# Patient Record
Sex: FEMALE | Race: WHITE | NOT HISPANIC OR LATINO | Employment: OTHER | URBAN - METROPOLITAN AREA
[De-identification: names, ages, dates, MRNs, and addresses within clinical notes are randomized per-mention and may not be internally consistent; named-entity substitution may affect disease eponyms.]

---

## 2017-01-24 ENCOUNTER — HOSPITAL ENCOUNTER (OUTPATIENT)
Facility: MEDICAL CENTER | Age: 72
End: 2017-01-24
Attending: NURSE PRACTITIONER
Payer: MEDICARE

## 2017-01-24 ENCOUNTER — NON-PROVIDER VISIT (OUTPATIENT)
Dept: MEDICAL GROUP | Facility: CLINIC | Age: 72
End: 2017-01-24
Payer: MEDICARE

## 2017-01-24 ENCOUNTER — TELEPHONE (OUTPATIENT)
Dept: MEDICAL GROUP | Facility: CLINIC | Age: 72
End: 2017-01-24

## 2017-01-24 DIAGNOSIS — I10 ESSENTIAL HYPERTENSION: ICD-10-CM

## 2017-01-24 LAB
BASOPHILS # BLD AUTO: 0.07 K/UL (ref 0–0.12)
BASOPHILS NFR BLD AUTO: 1.3 % (ref 0–1.8)
EOSINOPHIL # BLD: 0.23 K/UL (ref 0–0.51)
EOSINOPHIL NFR BLD AUTO: 4.2 % (ref 0–6.9)
ERYTHROCYTE [DISTWIDTH] IN BLOOD BY AUTOMATED COUNT: 45.5 FL (ref 35.9–50)
HCT VFR BLD AUTO: 38.8 % (ref 37–47)
HGB BLD-MCNC: 11.9 G/DL (ref 12–16)
IMM GRANULOCYTES # BLD AUTO: 0.02 K/UL (ref 0–0.11)
IMM GRANULOCYTES NFR BLD AUTO: 0.4 % (ref 0–0.9)
LYMPHOCYTES # BLD: 0.8 K/UL (ref 1–4.8)
LYMPHOCYTES NFR BLD AUTO: 14.5 % (ref 22–41)
MCH RBC QN AUTO: 29.8 PG (ref 27–33)
MCHC RBC AUTO-ENTMCNC: 30.7 G/DL (ref 33.6–35)
MCV RBC AUTO: 97 FL (ref 81.4–97.8)
MONOCYTES # BLD: 0.85 K/UL (ref 0–0.85)
MONOCYTES NFR BLD AUTO: 15.4 % (ref 0–13.4)
NEUTROPHILS # BLD: 3.55 K/UL (ref 2–7.15)
NEUTROPHILS NFR BLD AUTO: 64.2 % (ref 44–72)
NRBC # BLD AUTO: 0 K/UL
NRBC BLD-RTO: 0 /100 WBC
PLATELET # BLD AUTO: 304 K/UL (ref 164–446)
PMV BLD AUTO: 9.2 FL (ref 9–12.9)
RBC # BLD AUTO: 4 M/UL (ref 4.2–5.4)
T4 FREE SERPL-MCNC: 0.77 NG/DL (ref 0.53–1.43)
TSH SERPL DL<=0.005 MIU/L-ACNC: 6.02 UIU/ML (ref 0.3–3.7)
WBC # BLD AUTO: 5.5 K/UL (ref 4.8–10.8)

## 2017-01-24 PROCEDURE — 99000 SPECIMEN HANDLING OFFICE-LAB: CPT | Performed by: NURSE PRACTITIONER

## 2017-01-24 PROCEDURE — 36415 COLL VENOUS BLD VENIPUNCTURE: CPT | Performed by: NURSE PRACTITIONER

## 2017-01-24 PROCEDURE — 84439 ASSAY OF FREE THYROXINE: CPT

## 2017-01-24 PROCEDURE — 85025 COMPLETE CBC W/AUTO DIFF WBC: CPT

## 2017-01-24 PROCEDURE — 84443 ASSAY THYROID STIM HORMONE: CPT

## 2017-01-24 NOTE — TELEPHONE ENCOUNTER
Received a call regarding patient's medication.  They need the paperwork signed for the medication.  [phone]

## 2017-01-25 ENCOUNTER — OFFICE VISIT (OUTPATIENT)
Dept: MEDICAL GROUP | Facility: CLINIC | Age: 72
End: 2017-01-25
Payer: MEDICARE

## 2017-01-25 VITALS
WEIGHT: 124 LBS | OXYGEN SATURATION: 92 % | RESPIRATION RATE: 16 BRPM | HEIGHT: 62 IN | BODY MASS INDEX: 22.82 KG/M2 | SYSTOLIC BLOOD PRESSURE: 134 MMHG | TEMPERATURE: 98.3 F | DIASTOLIC BLOOD PRESSURE: 82 MMHG | HEART RATE: 88 BPM

## 2017-01-25 DIAGNOSIS — G89.29 CHRONIC BILATERAL LOW BACK PAIN WITHOUT SCIATICA: ICD-10-CM

## 2017-01-25 DIAGNOSIS — F31.30 BIPOLAR AFFECTIVE DISORDER, CURRENT EPISODE DEPRESSED, CURRENT EPISODE SEVERITY UNSPECIFIED (HCC): ICD-10-CM

## 2017-01-25 DIAGNOSIS — M54.50 CHRONIC BILATERAL LOW BACK PAIN WITHOUT SCIATICA: ICD-10-CM

## 2017-01-25 DIAGNOSIS — D50.9 IRON DEFICIENCY ANEMIA, UNSPECIFIED IRON DEFICIENCY ANEMIA TYPE: ICD-10-CM

## 2017-01-25 PROCEDURE — G8432 DEP SCR NOT DOC, RNG: HCPCS | Performed by: NURSE PRACTITIONER

## 2017-01-25 PROCEDURE — 4040F PNEUMOC VAC/ADMIN/RCVD: CPT | Mod: 8P | Performed by: NURSE PRACTITIONER

## 2017-01-25 PROCEDURE — 3014F SCREEN MAMMO DOC REV: CPT | Mod: 8P | Performed by: NURSE PRACTITIONER

## 2017-01-25 PROCEDURE — G8484 FLU IMMUNIZE NO ADMIN: HCPCS | Performed by: NURSE PRACTITIONER

## 2017-01-25 PROCEDURE — G8419 CALC BMI OUT NRM PARAM NOF/U: HCPCS | Performed by: NURSE PRACTITIONER

## 2017-01-25 PROCEDURE — 1101F PT FALLS ASSESS-DOCD LE1/YR: CPT | Mod: 8P | Performed by: NURSE PRACTITIONER

## 2017-01-25 PROCEDURE — 99214 OFFICE O/P EST MOD 30 MIN: CPT | Performed by: NURSE PRACTITIONER

## 2017-01-25 PROCEDURE — 1036F TOBACCO NON-USER: CPT | Performed by: NURSE PRACTITIONER

## 2017-01-25 PROCEDURE — 3017F COLORECTAL CA SCREEN DOC REV: CPT | Mod: 8P | Performed by: NURSE PRACTITIONER

## 2017-01-25 RX ORDER — FERROUS SULFATE 325(65) MG
325 TABLET ORAL DAILY
Qty: 30 TAB | Refills: 2 | Status: SHIPPED | OUTPATIENT
Start: 2017-01-25 | End: 2017-05-25 | Stop reason: SDUPTHER

## 2017-01-25 RX ORDER — TRAZODONE HYDROCHLORIDE 100 MG/1
200 TABLET ORAL NIGHTLY PRN
Qty: 60 TAB | Refills: 2 | Status: SHIPPED | OUTPATIENT
Start: 2017-01-25 | End: 2017-04-04 | Stop reason: SDUPTHER

## 2017-01-25 RX ORDER — OXYCODONE AND ACETAMINOPHEN 10; 325 MG/1; MG/1
1 TABLET ORAL 2 TIMES DAILY PRN
Qty: 75 TAB | Refills: 0 | Status: SHIPPED | OUTPATIENT
Start: 2017-01-25 | End: 2017-04-06 | Stop reason: SDUPTHER

## 2017-01-25 RX ORDER — OXYCODONE AND ACETAMINOPHEN 10; 325 MG/1; MG/1
1 TABLET ORAL 2 TIMES DAILY PRN
Qty: 75 TAB | Refills: 0 | Status: SHIPPED | OUTPATIENT
Start: 2017-01-25 | End: 2017-01-25 | Stop reason: SDUPTHER

## 2017-01-25 NOTE — PROGRESS NOTES
Chief Complaint   Patient presents with   • Follow-Up     labs       HISTORY OF PRESENT ILLNESS: Patient is a 71 y.o. female established patient who presents today to request medication refills, to review her recent lab results, and to discuss her health concerns as outlined below.      Low back pain  Chronic, takes 2-3 pills per day  Will schedule with spine    Iron deficiency anemia  Start iron    Bipolar affect, depressed  This is a chronic problem that is controlled with trazodone. Patient denies any adverse reactions to her medication and is requesting a refill today.        Patient Active Problem List    Diagnosis Date Noted   • Iron deficiency anemia 01/25/2017   • Skin lesion of face 08/25/2016   • Oxygen dependent 06/08/2016   • Anxiety 05/20/2015   • Anxiety and depression 04/02/2015   • Low back pain 01/23/2014   • Chest pain 04/18/2013   • Syncope and collapse 04/18/2013   • Essential hypertension 04/18/2013   • Bipolar affect, depressed (CMS-HCC) 06/23/2012   • Nocturnal hypoxia 06/23/2012   • Depressed 09/07/2011   • Tobacco abuse 09/07/2011   • COPD (chronic obstructive pulmonary disease) (CMS-HCC)        Allergies:Bee    Current Outpatient Prescriptions   Medication Sig Dispense Refill   • oxycodone-acetaminophen (PERCOCET-10)  MG Tab Take 1 Tab by mouth 2 times a day as needed for Severe Pain (2-3 times daily). 75 Tab 0   • ferrous sulfate 325 (65 FE) MG tablet Take 1 Tab by mouth every day. 30 Tab 2   • trazodone (DESYREL) 100 MG Tab Take 2 Tabs by mouth at bedtime as needed for Sleep. AS NEEDED FOR SLEEP 60 Tab 2   • lisinopril (PRINIVIL) 10 MG Tab TAKE ONE TABLET BY MOUTH ONCE A DAY 90 Tab 1   • conjugated estrogen (PREMARIN) 0.625 MG Tab TAKE ONE TABLET BY MOUTH DAILY 90 Tab 1   • baclofen (LIORESAL) 10 MG Tab Take 1 Tab by mouth 3 times a day. 90 Tab 0   • escitalopram (LEXAPRO) 20 MG tablet TAKE ONE TABLET BY MOUTH ONE TIME A DAY 90 Tab 1   • Cholecalciferol (VITAMIN D3) 2000 UNIT Cap  Take 1 Cap by mouth every day. 30 Cap 3   • gabapentin (NEURONTIN) 300 MG Cap TAKE ONE CAPSULE BY MOUTH TWICE A  Cap 3   • buPROPion SR (WELLBUTRIN-SR) 150 MG TABLET SR 12 HR sustained-release tablet TAKE ONE TABLET BY MOUTH TWICE A DAY 60 Tab 2   • fluticasone-salmeterol (ADVAIR DISKUS) 100-50 MCG/DOSE AEROSOL POWDER, BREATH ACTIVATED Inhale 1 Puff by mouth every 12 hours. 1 Inhaler 2   • aspirin (ASA) 325 MG Tab Take 325 mg by mouth every 6 hours as needed.     • buPROPion SR (WELLBUTRIN-SR) 150 MG TABLET SR 12 HR sustained-release tablet TAKE ONE TABLET BY MOUTH TWICE A DAY 60 Tab 0   • buPROPion SR (WELLBUTRIN-SR) 150 MG TABLET SR 12 HR sustained-release tablet      • gabapentin (NEURONTIN) 600 MG tablet Take 600 mg by mouth 2 times a day.     • hydrocodone-acetaminophen (NORCO) 5-325 MG Tab per tablet      • prednisoLONE acetate (PRED FORTE) 1 % Suspension      • olopatadine (PATANOL) 0.1 % ophthalmic solution PLACE 1 DROP IN EACH EYE TWO TIMES A DAY 1 Bottle 2   • clopidogrel (PLAVIX) 75 MG Tab Take 1 Tab by mouth every day. 30 Tab 6   • ondansetron (ZOFRAN ODT) 4 MG TABLET DISPERSIBLE Take 1 Tab by mouth every 8 hours as needed for Nausea/Vomiting. 10 Tab 0   • NON SPECIFIED O2 sat at 84% on room air in office, needs O2 24/7 and would like portable concentrator 1 Each 0   • albuterol (PROAIR HFA) 108 (90 BASE) MCG/ACT Aero Soln inhalation aerosol INHALE TWO PUFFS BY MOUTH EVERY 6 HOURS AS NEEDED FOR SHORTNESS OF BREATH 1 Inhaler 1   • predniSONE (DELTASONE) 20 MG TABS Take 40 mg by mouth every day.     • Multiple Vitamins-Minerals (WOMENS MULTI VITAMIN & MINERAL) TABS Take  by mouth every day.     • pravastatin (PRAVACHOL) 20 MG TABS Take 1 Tab by mouth every bedtime. 90 Tab 3   • tiotropium (SPIRIVA) 18 MCG CAPS Inhale 18 mcg by mouth every day.       No current facility-administered medications for this visit.       Reviewed today: Past medical history, social history, and family history. Updated as  "needed.    Social History     Social History Narrative       ROS:  Review of Systems   Constitutional: Negative for fever, lethargy and unexplained weight loss.   Respiratory: Negative for cough, wheezing and SOB.   Cardiovascular: Negative for chest pain, dizziness, and leg swelling.    Gastrointestinal: Negative for nausea, vomiting, and diarrhea.   Psych: Negative for anxiety and depression.    All other systems reviewed and are negative except as in HPI.      Exam:  Blood pressure 134/82, pulse 88, temperature 36.8 °C (98.3 °F), resp. rate 16, height 1.575 m (5' 2\"), weight 56.246 kg (124 lb), SpO2 92 %.  General:  Well nourished, well developed female in NAD  Head: normocephalic, atraumatic  Eyes: EOM within normal limits, no conjunctival injection, no scleral icterus  Nose: symmetrical, no discharge.  Neck: no masses, range of motion within normal limits, no tracheal deviation. No obvious thyroid enlargement. No adenopathy.   Pulmonary: chest is symmetrical with respiration, no wheezes, crackles, or rhonchi. Normal effort.   Cardiovascular: regular rate and rhythm without murmurs, rubs, or gallops.  Musculoskeletal: Normal gait, grossly normal muscle tone.  Extremities: no clubbing, cyanosis, or edema.  Psych: appropriate mood, affect, judgement.   Skin: Pink, warm, dry.    LABS: 1/24/17: Results reviewed and discussed with the patient, questions answered.        Please note that this dictation was created using voice recognition software. I have made every reasonable attempt to correct obvious errors, but I expect that there are errors of grammar and possibly content that I did not discover before finalizing the note.    Assessment/Plan:  1. Chronic bilateral low back pain without sciatica  oxycodone-acetaminophen (PERCOCET-10)  MG Tab    DISCONTINUED: oxycodone-acetaminophen (PERCOCET-10)  MG Tab    DISCONTINUED: oxycodone-acetaminophen (PERCOCET-10)  MG Tab    Stable. Continue current " medications, continue physical therapy. Referral initiated to spine. Follow-up in 3 months.   2. Iron deficiency anemia, unspecified iron deficiency anemia type  ferrous sulfate 325 (65 FE) MG tablet   3. Bipolar affective disorder, current episode depressed, current episode severity unspecified (CMS-HCC)  trazodone (DESYREL) 100 MG Tab

## 2017-01-25 NOTE — MR AVS SNAPSHOT
"        Sarah Almendarez Snowball   2017 2:40 PM   Office Visit   MRN: [id number]    [facility] Phone:  [phone]    Description:  Female : 1945   Provider:  MARICEL Giordano           Reason for Visit     Follow-Up labs      Allergies as of 2017     Allergen Noted Reactions    Bee 2010         You were diagnosed with     Chronic bilateral low back pain without sciatica   [9422590]   Stable. Continue current medications, continue physical therapy. Referral initiated to spine. Follow-up in 3 months.    Iron deficiency anemia, unspecified iron deficiency anemia type   [1418841]       Bipolar affective disorder, current episode depressed, current episode severity unspecified (CMS-HCC)   [[id number]]         Vital Signs     Blood Pressure Pulse Temperature Respirations Height Weight    134/82 mmHg 88 36.8 °C (98.3 °F) 16 1.575 m (5' 2\") 56.246 kg (124 lb)    Body Mass Index Oxygen Saturation Smoking Status             22.67 kg/m2 92% Former Smoker         Basic Information     Date Of Birth Sex Race Ethnicity Preferred Language    1945 Female White Non- English      Problem List              ICD-10-CM Priority Class Noted - Resolved    COPD (chronic obstructive pulmonary disease) (CMS-HCC) J44.9   Unknown - Present    Depressed F32.9   2011 - Present    Tobacco abuse Z72.0   2011 - Present    Bipolar affect, depressed (CMS-HCC) F31.30   2012 - Present    Nocturnal hypoxia G47.34   2012 - Present    Chest pain R07.9   2013 - Present    Syncope and collapse R55   2013 - Present    Essential hypertension I10   2013 - Present    Low back pain M54.5   2014 - Present    Anxiety and depression F41.9, F32.9   2015 - Present    Anxiety F41.9   2015 - Present    Oxygen dependent Z99.81   2016 - Present    Skin lesion of face L98.9   2016 - Present    Iron deficiency anemia D50.9   2017 - Present      "   Health Maintenance        Date Due Completion Dates    IMM DTaP/Tdap/Td Vaccine (1 - Tdap) 3/12/1966 ---    IMM ZOSTER VACCINE 3/12/2007 ---    IMM PNEUMOCOCCAL 65+ (ADULT) LOW/MEDIUM RISK SERIES (1 of 2 - PCV13) 3/12/2012 ---    MAMMOGRAM 7/16/2016 7/16/2015, 1/16/2014    IMM INFLUENZA (1) 9/1/2016 ---    BONE DENSITY 1/24/2019 1/24/2014 (Prv Comp)    Override on 1/24/2014: Previously completed    COLONOSCOPY 1/14/2024 1/14/2014 (Declined), 8/13/2002    Override on 1/14/2014: Patient Declined            Current Immunizations     No immunizations on file.      Below and/or attached are the medications your provider expects you to take. Review all of your home medications and newly ordered medications with your provider and/or pharmacist. Follow medication instructions as directed by your provider and/or pharmacist. Please keep your medication list with you and share with your provider. Update the information when medications are discontinued, doses are changed, or new medications (including over-the-counter products) are added; and carry medication information at all times in the event of emergency situations     Allergies:  BEE - (reactions not documented)               Medications  Valid as of: January 25, 2017 -  3:27 PM    Generic Name Brand Name Tablet Size Instructions for use    Albuterol Sulfate (Aero Soln) albuterol 108 (90 BASE) MCG/ACT INHALE TWO PUFFS BY MOUTH EVERY 6 HOURS AS NEEDED FOR SHORTNESS OF BREATH        Aspirin (Tab)  MG Take 325 mg by mouth every 6 hours as needed.        Baclofen (Tab) LIORESAL 10 MG TAKE ONE TABLET BY MOUTH THREE TIMES A DAY        BuPROPion HCl (TABLET SR 12 HR) WELLBUTRIN- MG         BuPROPion HCl (TABLET SR 12 HR) WELLBUTRIN- MG TAKE ONE TABLET BY MOUTH TWICE A DAY        BuPROPion HCl (TABLET SR 12 HR) WELLBUTRIN- MG TAKE ONE TABLET BY MOUTH TWICE A DAY        Cholecalciferol (Cap) Vitamin D3 2000 UNIT Take 1 Cap by mouth every day.         Clopidogrel Bisulfate (Tab) PLAVIX 75 MG Take 1 Tab by mouth every day.        Escitalopram Oxalate (Tab) LEXAPRO 20 MG TAKE ONE TABLET BY MOUTH ONE TIME A DAY        Estrogens Conjugated (Tab) PREMARIN 0.625 MG TAKE ONE TABLET BY MOUTH DAILY        Ferrous Sulfate (Tab) ferrous sulfate 325 (65 FE) MG Take 1 Tab by mouth every day.        Fluticasone-Salmeterol (AEROSOL POWDER, BREATH ACTIVATED) ADVAIR 100-50 MCG/DOSE Inhale 1 Puff by mouth every 12 hours.        Gabapentin (Tab) NEURONTIN 600 MG Take 600 mg by mouth 2 times a day.        Gabapentin (Cap) NEURONTIN 300 MG TAKE ONE CAPSULE BY MOUTH TWICE A DAY        Hydrocodone-Acetaminophen (Tab) NORCO 5-325 MG         Lisinopril (Tab) PRINIVIL 10 MG TAKE ONE TABLET BY MOUTH ONCE A DAY        Multiple Vitamins-Minerals (Tab) WOMENS MULTI VITAMIN & MINERAL  Take  by mouth every day.        NON SPECIFIED   O2 sat at 84% on room air in office, needs O2 24/7 and would like portable concentrator        Olopatadine HCl (Solution) PATANOL 0.1 % PLACE 1 DROP IN EACH EYE TWO TIMES A DAY        Ondansetron (TABLET DISPERSIBLE) ZOFRAN ODT 4 MG Take 1 Tab by mouth every 8 hours as needed for Nausea/Vomiting.        Oxycodone-Acetaminophen (Tab) PERCOCET-10  MG Take 1 Tab by mouth 2 times a day as needed for Severe Pain (2-3 times daily).        Pravastatin Sodium (Tab) PRAVACHOL 20 MG Take 1 Tab by mouth every bedtime.        PrednisoLONE Acetate (Suspension) PRED FORTE 1 %         PredniSONE (Tab) DELTASONE 20 MG Take 40 mg by mouth every day.        Tiotropium Bromide Monohydrate (Cap) SPIRIVA 18 MCG Inhale 18 mcg by mouth every day.        TraZODone HCl (Tab) DESYREL 100 MG Take 2 Tabs by mouth at bedtime as needed for Sleep. AS NEEDED FOR SLEEP        .                 Medicines prescribed today were sent to:     [facility] - [place], [place] - [address] [place] [address]    [address] [facility] [address] [facility] [address]    Phone: [phone] Fax: [phone]     Open 24 Hours?: No      Medication refill instructions:       If your prescription bottle indicates you have medication refills left, it is not necessary to call your provider’s office. Please contact your pharmacy and they will refill your medication.    If your prescription bottle indicates you do not have any refills left, you may request refills at any time through one of the following ways: The online [employer] system (except Urgent Care), by calling your provider’s office, or by asking your pharmacy to contact your provider’s office with a refill request. Medication refills are processed only during regular business hours and may not be available until the next business day. Your provider may request additional information or to have a follow-up visit with you prior to refilling your medication.   *Please Note: Medication refills are assigned a new Rx number when refilled electronically. Your pharmacy may indicate that no refills were authorized even though a new prescription for the same medication is available at the pharmacy. Please request the medicine by name with the pharmacy before contacting your provider for a refill.           [employer] Access Code: [email or web address]  Expires: 2/23/2017  9:36 AM    [employer]  A secure, online tool to manage your health information     [employer]® is a secure, online tool that connects you to your personalized health information from the privacy of your home -- day or night - making it very easy for you to manage your healthcare. Once the activation process is completed, you can even access your medical information using the [employer] fanny, which is available for free in the Apple Fanny store or Google Play store.     [employer] provides the following levels of access (as shown below):   My Chart Features   [facility] Doctor [facility]  Specialists [facility]  Urgent  Care Non-[facility]  Primary Care  Doctor   Email your healthcare team securely and privately 24/7 X X X     Manage appointments: schedule your next appointment; view details of past/upcoming appointments X      Request prescription refills. X      View recent personal medical records, including lab and immunizations X X X X   View health record, including health history, allergies, medications X X X X   Read reports about your outpatient visits, procedures, consult and ER notes X X X X   See your discharge summary, which is a recap of your hospital and/or ER visit that includes your diagnosis, lab results, and care plan. X X       How to register for [employer]:  1. Go to  [email or web address].  2. Click on the Sign Up Now box, which takes you to the New Member Sign Up page. You will need to provide the following information:  a. Enter your [employer] Access Code exactly as it appears at the top of this page. (You will not need to use this code after you’ve completed the sign-up process. If you do not sign up before the expiration date, you must request a new code.)   b. Enter your date of birth.   c. Enter your home email address.   d. Click Submit, and follow the next screen’s instructions.  3. Create a [employer] ID. This will be your [employer] login ID and cannot be changed, so think of one that is secure and easy to remember.  4. Create a [employer] password. You can change your password at any time.  5. Enter your Password Reset Question and Answer. This can be used at a later time if you forget your password.   6. Enter your e-mail address. This allows you to receive e-mail notifications when new information is available in [employer].  7. Click Sign Up. You can now view your health information.    For assistance activating your [employer] account, call [phone]

## 2017-01-26 DIAGNOSIS — I15.9 SECONDARY HYPERTENSION, UNSPECIFIED: ICD-10-CM

## 2017-01-26 DIAGNOSIS — F32.A DEPRESSION, UNSPECIFIED DEPRESSION TYPE: ICD-10-CM

## 2017-01-26 DIAGNOSIS — J43.9 PULMONARY EMPHYSEMA, UNSPECIFIED EMPHYSEMA TYPE (HCC): ICD-10-CM

## 2017-01-26 RX ORDER — LISINOPRIL 10 MG/1
TABLET ORAL
Qty: 90 TAB | Refills: 1 | Status: SHIPPED | OUTPATIENT
Start: 2017-01-26 | End: 2017-08-21 | Stop reason: SDUPTHER

## 2017-01-26 RX ORDER — ESCITALOPRAM OXALATE 20 MG/1
TABLET ORAL
Qty: 90 TAB | Refills: 1 | Status: SHIPPED | OUTPATIENT
Start: 2017-01-26 | End: 2017-05-19

## 2017-01-26 RX ORDER — BACLOFEN 10 MG/1
10 TABLET ORAL 3 TIMES DAILY
Qty: 90 TAB | Refills: 0 | Status: SHIPPED | OUTPATIENT
Start: 2017-01-26 | End: 2017-08-30 | Stop reason: SDUPTHER

## 2017-01-30 ENCOUNTER — TELEPHONE (OUTPATIENT)
Dept: MEDICAL GROUP | Facility: CLINIC | Age: 72
End: 2017-01-30

## 2017-01-30 NOTE — TELEPHONE ENCOUNTER
1. Caller Name: Jared                      Call Back Number: [phone]    2. Message: Preformance 20mcg per 2ml, BID and albuterol 2.5mg/ 3ML, 1 QD PRN for rescue needs to be added onto her medication list.  Once this is done please fax chart notes and updated medicaiton lest to [phone].    3. Patient approves office to leave a detailed voicemail/MyChart message: no and N\A

## 2017-02-01 ENCOUNTER — TELEPHONE (OUTPATIENT)
Dept: MEDICAL GROUP | Facility: CLINIC | Age: 72
End: 2017-02-01

## 2017-02-01 NOTE — TELEPHONE ENCOUNTER
Status: Signed         Expand All Collapse All    1. Caller Name: Jared                      Call Back Number: [phone]    2. Message: Preformance 20mcg per 2ml, BID and albuterol 2.5mg/ 3ML, 1 QD PRN for rescue needs to be added onto her medication list.  Once this is done please fax chart notes and updated medicaiton list to [phone].    3. Patient approves office to leave a detailed voicemail/MyChart message: no and N\A

## 2017-02-01 NOTE — ASSESSMENT & PLAN NOTE
This is a chronic problem that is controlled with trazodone. Patient denies any adverse reactions to her medication and is requesting a refill today.

## 2017-02-03 RX ORDER — FORMOTEROL FUMARATE DIHYDRATE 20 UG/2ML
20 SOLUTION RESPIRATORY (INHALATION)
COMMUNITY

## 2017-02-03 RX ORDER — BUDESONIDE 0.25 MG/2ML
250 INHALANT ORAL 2 TIMES DAILY
COMMUNITY
End: 2017-04-06

## 2017-02-03 RX ORDER — ALBUTEROL SULFATE 2.5 MG/3ML
2.5 SOLUTION RESPIRATORY (INHALATION) EVERY 4 HOURS PRN
COMMUNITY

## 2017-02-18 ENCOUNTER — PATIENT OUTREACH (OUTPATIENT)
Dept: HEALTH INFORMATION MANAGEMENT | Facility: OTHER | Age: 72
End: 2017-02-18

## 2017-02-18 NOTE — PROGRESS NOTES
Attempt #:1     Annual Wellness Visit Scheduling  1. Scheduling Status:Not Scheduled. Patient states they are not interested    MyChart Activation: declined

## 2017-02-23 ENCOUNTER — OFFICE VISIT (OUTPATIENT)
Dept: MEDICAL GROUP | Facility: CLINIC | Age: 72
End: 2017-02-23
Payer: MEDICARE

## 2017-02-23 VITALS
HEART RATE: 83 BPM | HEIGHT: 62 IN | TEMPERATURE: 98.1 F | WEIGHT: 127 LBS | SYSTOLIC BLOOD PRESSURE: 122 MMHG | OXYGEN SATURATION: 96 % | BODY MASS INDEX: 23.37 KG/M2 | DIASTOLIC BLOOD PRESSURE: 74 MMHG | RESPIRATION RATE: 16 BRPM

## 2017-02-23 DIAGNOSIS — J44.9 CHRONIC OBSTRUCTIVE PULMONARY DISEASE, UNSPECIFIED COPD TYPE (HCC): ICD-10-CM

## 2017-02-23 DIAGNOSIS — L98.9 SKIN LESION OF FACE: ICD-10-CM

## 2017-02-23 DIAGNOSIS — G89.29 OTHER CHRONIC PAIN: ICD-10-CM

## 2017-02-23 PROCEDURE — G8420 CALC BMI NORM PARAMETERS: HCPCS | Performed by: NURSE PRACTITIONER

## 2017-02-23 PROCEDURE — 3017F COLORECTAL CA SCREEN DOC REV: CPT | Mod: 8P | Performed by: NURSE PRACTITIONER

## 2017-02-23 PROCEDURE — 99214 OFFICE O/P EST MOD 30 MIN: CPT | Performed by: NURSE PRACTITIONER

## 2017-02-23 PROCEDURE — 1101F PT FALLS ASSESS-DOCD LE1/YR: CPT | Mod: 8P | Performed by: NURSE PRACTITIONER

## 2017-02-23 PROCEDURE — G8484 FLU IMMUNIZE NO ADMIN: HCPCS | Performed by: NURSE PRACTITIONER

## 2017-02-23 PROCEDURE — 4040F PNEUMOC VAC/ADMIN/RCVD: CPT | Mod: 8P | Performed by: NURSE PRACTITIONER

## 2017-02-23 PROCEDURE — 1036F TOBACCO NON-USER: CPT | Performed by: NURSE PRACTITIONER

## 2017-02-23 PROCEDURE — 3014F SCREEN MAMMO DOC REV: CPT | Mod: 8P | Performed by: NURSE PRACTITIONER

## 2017-02-23 PROCEDURE — G8432 DEP SCR NOT DOC, RNG: HCPCS | Performed by: NURSE PRACTITIONER

## 2017-02-23 NOTE — ASSESSMENT & PLAN NOTE
This is a chronic problem. Patient was seen by dermatology. She was told her lesions were benign. She does not need follow-up for this.

## 2017-02-23 NOTE — ASSESSMENT & PLAN NOTE
This is a chronic problem. Patient continues taking Percocet 10 mg 2-3 times daily. She states she was seen by Dr. Maldonado who attempted an epidural injection. She states she could not tolerate the procedure. She has not had any follow-up and does not know her treatment plan. Patient was encouraged to call Dr. Maldonado's office to schedule a follow-up appointment. I will request her records.

## 2017-02-23 NOTE — ASSESSMENT & PLAN NOTE
This is a chronic problem that is well controlled with her current medications. Patient is using portable oxygen at 2 L/m via nasal cannula. She denies any problems or concerns at this time.

## 2017-02-23 NOTE — PROGRESS NOTES
Chief Complaint   Patient presents with   • Medication Refill     pain meds       HISTORY OF PRESENT ILLNESS: Patient is a 71 y.o. female established patient who presents today for medication refills. Patient states she needs a refill of her Percocet. A review her records indicates she has 2 prescriptions on file at the local pharmacy. We called the pharmacy to verify. She was instructed to  her prescription and continue her current medications as instructed. She will schedule a follow-up appointment for medication refill at the appropriate time.      Skin lesion of face  This is a chronic problem. Patient was seen by dermatology. She was told her lesions were benign. She does not need follow-up for this.    COPD (chronic obstructive pulmonary disease)  This is a chronic problem that is well controlled with her current medications. Patient is using portable oxygen at 2 L/m via nasal cannula. She denies any problems or concerns at this time.     Low back pain  This is a chronic problem. Patient continues taking Percocet 10 mg 2-3 times daily. She states she was seen by Dr. Maldonado who attempted an epidural injection. She states she could not tolerate the procedure. She has not had any follow-up and does not know her treatment plan. Patient was encouraged to call Dr. Maldonado's office to schedule a follow-up appointment. I will request her records.        Patient Active Problem List    Diagnosis Date Noted   • Chronic pain 02/23/2017   • Iron deficiency anemia 01/25/2017   • Skin lesion of face 08/25/2016   • Oxygen dependent 06/08/2016   • Anxiety 05/20/2015   • Anxiety and depression 04/02/2015   • Low back pain 01/23/2014   • Chest pain 04/18/2013   • Syncope and collapse 04/18/2013   • Essential hypertension 04/18/2013   • Bipolar affect, depressed (CMS-[facility]) 06/23/2012   • Nocturnal hypoxia 06/23/2012   • Depressed 09/07/2011   • Tobacco abuse 09/07/2011   • COPD (chronic obstructive pulmonary disease)  (CMS-[facility])        Allergies:Bee    Current Outpatient Prescriptions   Medication Sig Dispense Refill   • albuterol (PROVENTIL) 2.5mg/3ml Nebu Soln solution for nebulization 2.5 mg by Nebulization route every four hours as needed for Shortness of Breath.     • budesonide (PULMICORT) 0.25 MG/2ML Suspension 250 mcg by Nebulization route 2 times a day.     • formoterol fumarate (PERFOROMIST) 20 MCG/2ML Nebu Soln Inhale 20 mcg by mouth.     • lisinopril (PRINIVIL) 10 MG Tab TAKE ONE TABLET BY MOUTH ONCE A DAY 90 Tab 1   • conjugated estrogen (PREMARIN) 0.625 MG Tab TAKE ONE TABLET BY MOUTH DAILY 90 Tab 1   • baclofen (LIORESAL) 10 MG Tab Take 1 Tab by mouth 3 times a day. 90 Tab 0   • escitalopram (LEXAPRO) 20 MG tablet TAKE ONE TABLET BY MOUTH ONE TIME A DAY 90 Tab 1   • oxycodone-acetaminophen (PERCOCET-10)  MG Tab Take 1 Tab by mouth 2 times a day as needed for Severe Pain (2-3 times daily). 75 Tab 0   • ferrous sulfate 325 (65 FE) MG tablet Take 1 Tab by mouth every day. 30 Tab 2   • trazodone (DESYREL) 100 MG Tab Take 2 Tabs by mouth at bedtime as needed for Sleep. AS NEEDED FOR SLEEP 60 Tab 2   • Cholecalciferol (VITAMIN D3) 2000 UNIT Cap Take 1 Cap by mouth every day. 30 Cap 3   • gabapentin (NEURONTIN) 300 MG Cap TAKE ONE CAPSULE BY MOUTH TWICE A  Cap 3   • fluticasone-salmeterol (ADVAIR DISKUS) 100-50 MCG/DOSE AEROSOL POWDER, BREATH ACTIVATED Inhale 1 Puff by mouth every 12 hours. 1 Inhaler 2   • buPROPion SR (WELLBUTRIN-SR) 150 MG TABLET SR 12 HR sustained-release tablet TAKE ONE TABLET BY MOUTH TWICE A DAY 60 Tab 0   • olopatadine (PATANOL) 0.1 % ophthalmic solution PLACE 1 DROP IN EACH EYE TWO TIMES A DAY 1 Bottle 2   • clopidogrel (PLAVIX) 75 MG Tab Take 1 Tab by mouth every day. 30 Tab 6   • ondansetron (ZOFRAN ODT) 4 MG TABLET DISPERSIBLE Take 1 Tab by mouth every 8 hours as needed for Nausea/Vomiting. 10 Tab 0   • albuterol (PROAIR HFA) 108 (90 BASE) MCG/ACT Aero Soln inhalation aerosol INHALE  "TWO PUFFS BY MOUTH EVERY 6 HOURS AS NEEDED FOR SHORTNESS OF BREATH 1 Inhaler 1   • pravastatin (PRAVACHOL) 20 MG TABS Take 1 Tab by mouth every bedtime. 90 Tab 3   • buPROPion SR (WELLBUTRIN-SR) 150 MG TABLET SR 12 HR sustained-release tablet TAKE ONE TABLET BY MOUTH TWICE A DAY (Patient not taking: Reported on 2/23/2017) 60 Tab 2   • aspirin (ASA) 325 MG Tab Take 325 mg by mouth every 6 hours as needed.     • buPROPion SR (WELLBUTRIN-SR) 150 MG TABLET SR 12 HR sustained-release tablet      • gabapentin (NEURONTIN) 600 MG tablet Take 600 mg by mouth 2 times a day.     • hydrocodone-acetaminophen (NORCO) 5-325 MG Tab per tablet      • prednisoLONE acetate (PRED FORTE) 1 % Suspension      • NON SPECIFIED O2 sat at 84% on room air in office, needs O2 24/7 and would like portable concentrator (Patient not taking: Reported on 2/23/2017) 1 Each 0   • predniSONE (DELTASONE) 20 MG TABS Take 40 mg by mouth every day.     • Multiple Vitamins-Minerals (WOMENS MULTI VITAMIN & MINERAL) TABS Take  by mouth every day.     • tiotropium (SPIRIVA) 18 MCG CAPS Inhale 18 mcg by mouth every day.       No current facility-administered medications for this visit.       Reviewed today: Past medical history, social history, and family history. Updated as needed.    Social History     Social History Narrative       ROS:  Review of Systems   Constitutional: Negative for fever, lethargy and unexplained weight loss.   Respiratory: Positive for chronic cough and intermittent wheezing. No exacerbations above baseline at this time.  Cardiovascular: Negative for chest pain, dizziness, and leg swelling.    Gastrointestinal: Negative for nausea, vomiting, and diarrhea.   Psych: Negative for anxiety and depression.    All other systems reviewed and are negative except as in HPI.      Exam:  Blood pressure 122/74, pulse 83, temperature 36.7 °C (98.1 °F), resp. rate 16, height 1.575 m (5' 2\"), weight 57.607 kg (127 lb), SpO2 96 %.  General:  Well " nourished, well developed female in NAD  Head: normocephalic, atraumatic  Eyes: EOM within normal limits, no conjunctival injection, no scleral icterus  Nose: symmetrical, no discharge.  Neck: no masses, range of motion within normal limits, no tracheal deviation. No obvious thyroid enlargement. No adenopathy.   Pulmonary: chest is symmetrical with respiration, no wheezes, crackles, or rhonchi. Normal effort.   Cardiovascular: regular rate and rhythm without murmurs, rubs, or gallops.  Musculoskeletal: Ambulates with a walking stick for balance.  Extremities: no clubbing, cyanosis, or edema.  Psych: appropriate mood, affect, judgement.   Skin: Pink, warm, dry.      Please note that this dictation was created using voice recognition software. I have made every reasonable attempt to correct obvious errors, but I expect that there are errors of grammar and possibly content that I did not discover before finalizing the note.    Assessment/Plan:  1. Skin lesion of face      Controlled. Monitor for new lesions.   2. Chronic obstructive pulmonary disease, unspecified COPD type (CMS-HCC)      Controlled. Continue current medications and oxygen. Follow-up in 3-6 months.   3. Other chronic pain      Controlled. Continue current medications, schedule with spine. Follow-up in 2 months.

## 2017-02-23 NOTE — MR AVS SNAPSHOT
"        Sarah Almendarez Snowball   2017 1:20 PM   Office Visit   MRN: [id number]    Department:  [facility]   Dept Phone:  [phone]    Description:  Female : 1945   Provider:  MARICEL Giordano           Reason for Visit     Medication Refill pain meds      Allergies as of 2017     Allergen Noted Reactions    Bee 2010         You were diagnosed with     Skin lesion of face   [143629]   Controlled. Monitor for new lesions.    Chronic obstructive pulmonary disease, unspecified COPD type (CMS-[facility])   [4774659]   Controlled. Continue current medications and oxygen. Follow-up in 3-6 months.    Other chronic pain   [338.29.ICD-9-CM]   Controlled. Continue current medications, schedule with spine. Follow-up in 2 months.      Vital Signs     Blood Pressure Pulse Temperature Respirations Height Weight    122/74 mmHg 83 36.7 °C (98.1 °F) 16 1.575 m (5' 2\") 57.607 kg (127 lb)    Body Mass Index Oxygen Saturation Smoking Status             23.22 kg/m2 96% Former Smoker         Basic Information     Date Of Birth Sex Race Ethnicity Preferred Language    1945 Female White Non- English      Your appointments     2017  1:20 PM   NEW TO YOU with MARICEL Casiano   [facility] (--)    [address] [place] [address] [id number] [phone]              Problem List              ICD-10-CM Priority Class Noted - Resolved    COPD (chronic obstructive pulmonary disease) (CMS-HCC) J44.9   Unknown - Present    Depressed F32.9   2011 - Present    Tobacco abuse Z72.0   2011 - Present    Bipolar affect, depressed (CMS-HCC) F31.30   2012 - Present    Nocturnal hypoxia G47.34   2012 - Present    Chest pain R07.9   2013 - Present    Syncope and collapse R55   2013 - Present    Essential hypertension I10   2013 - Present    Low back pain M54.5   2014 - Present    Anxiety and depression F41.9, F32.9   2015 - " Present    Anxiety F41.9   5/20/2015 - Present    Oxygen dependent Z99.81   6/8/2016 - Present    Skin lesion of face L98.9   8/25/2016 - Present    Iron deficiency anemia D50.9   1/25/2017 - Present    Chronic pain G89.29   2/23/2017 - Present      Health Maintenance        Date Due Completion Dates    IMM DTaP/Tdap/Td Vaccine (1 - Tdap) 3/12/1966 ---    IMM PNEUMOCOCCAL 65+ (ADULT) LOW/MEDIUM RISK SERIES (1 of 2 - PCV13) 3/12/2012 ---    MAMMOGRAM 7/16/2016 7/16/2015, 1/16/2014    IMM INFLUENZA (1) 9/1/2016 ---    BONE DENSITY 1/24/2019 1/24/2014 (Prv Comp)    Override on 1/24/2014: Previously completed    COLONOSCOPY 1/14/2024 1/14/2014 (Declined), 8/13/2002    Override on 1/14/2014: Patient Declined            Current Immunizations     SHINGLES VACCINE 12/1/2014      Below and/or attached are the medications your provider expects you to take. Review all of your home medications and newly ordered medications with your provider and/or pharmacist. Follow medication instructions as directed by your provider and/or pharmacist. Please keep your medication list with you and share with your provider. Update the information when medications are discontinued, doses are changed, or new medications (including over-the-counter products) are added; and carry medication information at all times in the event of emergency situations     Allergies:  BEE - (reactions not documented)               Medications  Valid as of: February 23, 2017 -  2:31 PM    Generic Name Brand Name Tablet Size Instructions for use    Albuterol Sulfate (Aero Soln) albuterol 108 (90 BASE) MCG/ACT INHALE TWO PUFFS BY MOUTH EVERY 6 HOURS AS NEEDED FOR SHORTNESS OF BREATH        Albuterol Sulfate (Nebu Soln) PROVENTIL 2.5mg/3ml 2.5 mg by Nebulization route every four hours as needed for Shortness of Breath.        Aspirin (Tab)  MG Take 325 mg by mouth every 6 hours as needed.        Baclofen (Tab) LIORESAL 10 MG Take 1 Tab by mouth 3 times a day.         Budesonide (Suspension) PULMICORT 0.25 MG/2ML 250 mcg by Nebulization route 2 times a day.        BuPROPion HCl (TABLET SR 12 HR) WELLBUTRIN- MG         BuPROPion HCl (TABLET SR 12 HR) WELLBUTRIN- MG TAKE ONE TABLET BY MOUTH TWICE A DAY        BuPROPion HCl (TABLET SR 12 HR) WELLBUTRIN- MG TAKE ONE TABLET BY MOUTH TWICE A DAY        Cholecalciferol (Cap) Vitamin D3 2000 UNIT Take 1 Cap by mouth every day.        Clopidogrel Bisulfate (Tab) PLAVIX 75 MG Take 1 Tab by mouth every day.        Escitalopram Oxalate (Tab) LEXAPRO 20 MG TAKE ONE TABLET BY MOUTH ONE TIME A DAY        Estrogens Conjugated (Tab) PREMARIN 0.625 MG TAKE ONE TABLET BY MOUTH DAILY        Ferrous Sulfate (Tab) ferrous sulfate 325 (65 FE) MG Take 1 Tab by mouth every day.        Fluticasone-Salmeterol (AEROSOL POWDER, BREATH ACTIVATED) ADVAIR 100-50 MCG/DOSE Inhale 1 Puff by mouth every 12 hours.        Formoterol Fumarate (Nebu Soln) PERFOROMIST 20 MCG/2ML Inhale 20 mcg by mouth.        Gabapentin (Tab) NEURONTIN 600 MG Take 600 mg by mouth 2 times a day.        Gabapentin (Cap) NEURONTIN 300 MG TAKE ONE CAPSULE BY MOUTH TWICE A DAY        Hydrocodone-Acetaminophen (Tab) NORCO 5-325 MG         Lisinopril (Tab) PRINIVIL 10 MG TAKE ONE TABLET BY MOUTH ONCE A DAY        Multiple Vitamins-Minerals (Tab) WOMENS MULTI VITAMIN & MINERAL  Take  by mouth every day.        NON SPECIFIED   O2 sat at 84% on room air in office, needs O2 24/7 and would like portable concentrator        Olopatadine HCl (Solution) PATANOL 0.1 % PLACE 1 DROP IN EACH EYE TWO TIMES A DAY        Ondansetron (TABLET DISPERSIBLE) ZOFRAN ODT 4 MG Take 1 Tab by mouth every 8 hours as needed for Nausea/Vomiting.        Oxycodone-Acetaminophen (Tab) PERCOCET-10  MG Take 1 Tab by mouth 2 times a day as needed for Severe Pain (2-3 times daily).        Pravastatin Sodium (Tab) PRAVACHOL 20 MG Take 1 Tab by mouth every bedtime.        PrednisoLONE Acetate  (Suspension) PRED FORTE 1 %         PredniSONE (Tab) DELTASONE 20 MG Take 40 mg by mouth every day.        Tiotropium Bromide Monohydrate (Cap) SPIRIVA 18 MCG Inhale 18 mcg by mouth every day.        TraZODone HCl (Tab) DESYREL 100 MG Take 2 Tabs by mouth at bedtime as needed for Sleep. AS NEEDED FOR SLEEP        .                 Medicines prescribed today were sent to:     [facility] - [place], [place] - [address] [facility]    [address] [facility] [address] [facility] [address]    Phone: [phone] Fax: [phone]    Open 24 Hours?: No      Medication refill instructions:       If your prescription bottle indicates you have medication refills left, it is not necessary to call your provider’s office. Please contact your pharmacy and they will refill your medication.    If your prescription bottle indicates you do not have any refills left, you may request refills at any time through one of the following ways: The online [employer] system (except Urgent Care), by calling your provider’s office, or by asking your pharmacy to contact your provider’s office with a refill request. Medication refills are processed only during regular business hours and may not be available until the next business day. Your provider may request additional information or to have a follow-up visit with you prior to refilling your medication.   *Please Note: Medication refills are assigned a new Rx number when refilled electronically. Your pharmacy may indicate that no refills were authorized even though a new prescription for the same medication is available at the pharmacy. Please request the medicine by name with the pharmacy before contacting your provider for a refill.           [employer] Access Code: [email or web address]  Expires: 3/25/2017  2:31 PM    [employer]  A secure, online tool to manage your health information     [facility] [employer]’s [employer]® is a secure, online tool that connects you to your personalized health information from  the privacy of your home -- day or night - making it very easy for you to manage your healthcare. Once the activation process is completed, you can even access your medical information using the [employer] fanny, which is available for free in the Apple Fanny store or Google Play store.     [employer] provides the following levels of access (as shown below):   My Chart Features   [facility] Doctor [facility]  Specialists [facility]  Urgent  Care Non-[facility]  Primary Care  Doctor   Email your healthcare team securely and privately 24/7 X X X    Manage appointments: schedule your next appointment; view details of past/upcoming appointments X      Request prescription refills. X      View recent personal medical records, including lab and immunizations X X X X   View health record, including health history, allergies, medications X X X X   Read reports about your outpatient visits, procedures, consult and ER notes X X X X   See your discharge summary, which is a recap of your hospital and/or ER visit that includes your diagnosis, lab results, and care plan. X X       How to register for [employer]:  1. Go to  [email or web address].  2. Click on the Sign Up Now box, which takes you to the New Member Sign Up page. You will need to provide the following information:  a. Enter your [employer] Access Code exactly as it appears at the top of this page. (You will not need to use this code after you’ve completed the sign-up process. If you do not sign up before the expiration date, you must request a new code.)   b. Enter your date of birth.   c. Enter your home email address.   d. Click Submit, and follow the next screen’s instructions.  3. Create a [employer] ID. This will be your [employer] login ID and cannot be changed, so think of one that is secure and easy to remember.  4. Create a [employer] password. You can change your password at any time.  5. Enter your Password Reset Question and Answer. This can be used at a later time if you  forget your password.   6. Enter your e-mail address. This allows you to receive e-mail notifications when new information is available in [employer].  7. Click Sign Up. You can now view your health information.    For assistance activating your [employer] account, call [phone]

## 2017-04-04 DIAGNOSIS — J43.9 PULMONARY EMPHYSEMA, UNSPECIFIED EMPHYSEMA TYPE (HCC): ICD-10-CM

## 2017-04-04 DIAGNOSIS — J43.0 UNILATERAL EMPHYSEMA (HCC): ICD-10-CM

## 2017-04-04 DIAGNOSIS — F31.30 BIPOLAR AFFECTIVE DISORDER, CURRENT EPISODE DEPRESSED, CURRENT EPISODE SEVERITY UNSPECIFIED (HCC): ICD-10-CM

## 2017-04-04 RX ORDER — ACETAMINOPHEN 160 MG
2000 TABLET,DISINTEGRATING ORAL DAILY
Qty: 30 CAP | Refills: 3 | Status: SHIPPED | OUTPATIENT
Start: 2017-04-04 | End: 2017-08-21 | Stop reason: SDUPTHER

## 2017-04-04 RX ORDER — TRAZODONE HYDROCHLORIDE 100 MG/1
200 TABLET ORAL NIGHTLY PRN
Qty: 60 TAB | Refills: 2 | Status: SHIPPED | OUTPATIENT
Start: 2017-04-04 | End: 2017-08-18 | Stop reason: SDUPTHER

## 2017-04-04 RX ORDER — ALBUTEROL SULFATE 90 UG/1
AEROSOL, METERED RESPIRATORY (INHALATION)
Qty: 1 INHALER | Refills: 1 | Status: SHIPPED | OUTPATIENT
Start: 2017-04-04 | End: 2017-08-14 | Stop reason: SDUPTHER

## 2017-04-06 ENCOUNTER — OFFICE VISIT (OUTPATIENT)
Dept: MEDICAL GROUP | Facility: CLINIC | Age: 72
End: 2017-04-06
Payer: MEDICARE

## 2017-04-06 ENCOUNTER — TELEPHONE (OUTPATIENT)
Dept: MEDICAL GROUP | Facility: CLINIC | Age: 72
End: 2017-04-06

## 2017-04-06 VITALS
RESPIRATION RATE: 18 BRPM | DIASTOLIC BLOOD PRESSURE: 78 MMHG | TEMPERATURE: 98.3 F | WEIGHT: 121 LBS | BODY MASS INDEX: 22.26 KG/M2 | HEIGHT: 62 IN | SYSTOLIC BLOOD PRESSURE: 120 MMHG | OXYGEN SATURATION: 92 % | HEART RATE: 89 BPM

## 2017-04-06 DIAGNOSIS — I10 ESSENTIAL HYPERTENSION: ICD-10-CM

## 2017-04-06 DIAGNOSIS — Z76.89 ESTABLISHING CARE WITH NEW DOCTOR, ENCOUNTER FOR: ICD-10-CM

## 2017-04-06 DIAGNOSIS — F33.1 MODERATE EPISODE OF RECURRENT MAJOR DEPRESSIVE DISORDER (HCC): ICD-10-CM

## 2017-04-06 DIAGNOSIS — L98.9 SKIN LESION OF FACE: ICD-10-CM

## 2017-04-06 DIAGNOSIS — F31.30 BIPOLAR AFFECTIVE DISORDER, CURRENT EPISODE DEPRESSED, CURRENT EPISODE SEVERITY UNSPECIFIED (HCC): ICD-10-CM

## 2017-04-06 DIAGNOSIS — Z79.891 CHRONICALLY ON OPIATE THERAPY: ICD-10-CM

## 2017-04-06 DIAGNOSIS — J44.9 CHRONIC OBSTRUCTIVE PULMONARY DISEASE, UNSPECIFIED COPD TYPE (HCC): ICD-10-CM

## 2017-04-06 DIAGNOSIS — J96.11 CHRONIC RESPIRATORY FAILURE WITH HYPOXIA (HCC): ICD-10-CM

## 2017-04-06 DIAGNOSIS — M54.50 CHRONIC BILATERAL LOW BACK PAIN WITHOUT SCIATICA: ICD-10-CM

## 2017-04-06 DIAGNOSIS — Z72.0 TOBACCO ABUSE: ICD-10-CM

## 2017-04-06 DIAGNOSIS — G89.29 CHRONIC BILATERAL LOW BACK PAIN WITHOUT SCIATICA: ICD-10-CM

## 2017-04-06 DIAGNOSIS — Z02.89 PAIN MANAGEMENT CONTRACT AGREEMENT: ICD-10-CM

## 2017-04-06 DIAGNOSIS — Z12.39 SCREENING FOR BREAST CANCER: ICD-10-CM

## 2017-04-06 DIAGNOSIS — F41.9 ANXIETY: ICD-10-CM

## 2017-04-06 DIAGNOSIS — M25.552 LEFT HIP PAIN: ICD-10-CM

## 2017-04-06 DIAGNOSIS — Z23 NEED FOR VACCINATION: ICD-10-CM

## 2017-04-06 PROCEDURE — 90670 PCV13 VACCINE IM: CPT | Performed by: NURSE PRACTITIONER

## 2017-04-06 PROCEDURE — 3014F SCREEN MAMMO DOC REV: CPT | Mod: 8P | Performed by: NURSE PRACTITIONER

## 2017-04-06 PROCEDURE — G8432 DEP SCR NOT DOC, RNG: HCPCS | Performed by: NURSE PRACTITIONER

## 2017-04-06 PROCEDURE — 3017F COLORECTAL CA SCREEN DOC REV: CPT | Mod: 8P | Performed by: NURSE PRACTITIONER

## 2017-04-06 PROCEDURE — G0009 ADMIN PNEUMOCOCCAL VACCINE: HCPCS | Performed by: NURSE PRACTITIONER

## 2017-04-06 PROCEDURE — 99215 OFFICE O/P EST HI 40 MIN: CPT | Mod: 25 | Performed by: NURSE PRACTITIONER

## 2017-04-06 PROCEDURE — G8420 CALC BMI NORM PARAMETERS: HCPCS | Performed by: NURSE PRACTITIONER

## 2017-04-06 PROCEDURE — 1101F PT FALLS ASSESS-DOCD LE1/YR: CPT | Mod: 8P | Performed by: NURSE PRACTITIONER

## 2017-04-06 PROCEDURE — 90472 IMMUNIZATION ADMIN EACH ADD: CPT | Performed by: NURSE PRACTITIONER

## 2017-04-06 PROCEDURE — 4040F PNEUMOC VAC/ADMIN/RCVD: CPT | Performed by: NURSE PRACTITIONER

## 2017-04-06 PROCEDURE — 1036F TOBACCO NON-USER: CPT | Performed by: NURSE PRACTITIONER

## 2017-04-06 PROCEDURE — 90715 TDAP VACCINE 7 YRS/> IM: CPT | Performed by: NURSE PRACTITIONER

## 2017-04-06 RX ORDER — OXYCODONE AND ACETAMINOPHEN 10; 325 MG/1; MG/1
1 TABLET ORAL 2 TIMES DAILY PRN
Qty: 60 TAB | Refills: 0 | Status: SHIPPED | OUTPATIENT
Start: 2017-04-06 | End: 2017-05-19 | Stop reason: SDUPTHER

## 2017-04-06 ASSESSMENT — PAIN SCALES - GENERAL: PAINLEVEL: 7=MODERATE-SEVERE PAIN

## 2017-04-06 NOTE — ASSESSMENT & PLAN NOTE
It is unclear which medications patient is taking. She does appear to have as needed albuterol use. She does not pulmonologist. Advised patient to bring her medications in to next visit. She does have prednisone listed on her medication list, she is unsure if she is taking daily.

## 2017-04-06 NOTE — ASSESSMENT & PLAN NOTE
Patient appears stable, although it is unclear which medication she is currently taking. Her blood pressure today is 120/78. She denies chest pain.

## 2017-04-06 NOTE — MR AVS SNAPSHOT
"        Sarah Almendarez Snowball   2017 1:20 PM   Office Visit   MRN: [id number]    Department:  [facility] Phone:  [phone]    Description:  Female : 1945   Provider:  MARICEL Casiano           Reason for Visit     Medication Refill     Anxiety wants something to calm her down      Allergies as of 2017     Allergen Noted Reactions    Bee 2010         You were diagnosed with     Establishing care with new doctor, encounter for   [529844]       Bipolar affective disorder, current episode depressed, current episode severity unspecified (CMS-HCC)   [1215199]       Moderate episode of recurrent major depressive disorder (CMS-HCC)   [8415268]       Anxiety   [047015]       Chronic obstructive pulmonary disease, unspecified COPD type (CMS-HCC)   [3298738]       Essential hypertension   [4680609]       Left hip pain   [542730]       Chronic bilateral low back pain without sciatica   [0634800]       Skin lesion of face   [896947]       Tobacco abuse   [793823]       Chronic respiratory failure with hypoxia (CMS-HCC)   [529138]       Pain management contract agreement   [[id number]]       Chronically on opiate therapy   [3580263]       Screening for breast cancer   [340363]       Need for vaccination   [355126]         Vital Signs     Blood Pressure Pulse Temperature Respirations Height Weight    120/78 mmHg 89 36.8 °C (98.3 °F) 18 1.575 m (5' 2.01\") 54.885 kg (121 lb)    Body Mass Index Oxygen Saturation Smoking Status             22.13 kg/m2 92% Former Smoker         Basic Information     Date Of Birth Sex Race Ethnicity Preferred Language    1945 Female White Non- English      Problem List              ICD-10-CM Priority Class Noted - Resolved    COPD (chronic obstructive pulmonary disease) (CMS-HCC) J44.9   Unknown - Present    Tobacco abuse Z72.0   2011 - Present    Bipolar affect, depressed (CMS-[facility]) F31.30   2012 - Present    Essential hypertension I10 "   4/18/2013 - Present    Low back pain M54.5   1/23/2014 - Present    Moderate episode of recurrent major depressive disorder (CMS-HCC) F33.1   4/2/2015 - Present    Anxiety F41.9   5/20/2015 - Present    Chronic respiratory failure with hypoxia (CMS-HCC) J96.11   6/8/2016 - Present    Skin lesion of face L98.9   8/25/2016 - Present    Iron deficiency anemia D50.9   1/25/2017 - Present    Chronic pain G89.29   2/23/2017 - Present    Left hip pain M25.552   4/6/2017 - Present    Pain management contract agreement Z02.89   4/6/2017 - Present    Chronically on opiate therapy Z79.899   4/6/2017 - Present      Health Maintenance        Date Due Completion Dates    IMM DTaP/Tdap/Td Vaccine (1 - Tdap) 3/12/1966 ---    IMM PNEUMOCOCCAL 65+ (ADULT) LOW/MEDIUM RISK SERIES (1 of 2 - PCV13) 3/12/2012 ---    MAMMOGRAM 7/16/2016 7/16/2015, 1/16/2014    BONE DENSITY 1/24/2019 1/24/2014 (Prv Comp)    Override on 1/24/2014: Previously completed    COLONOSCOPY 1/14/2024 1/14/2014 (Declined), 8/13/2002    Override on 1/14/2014: Patient Declined            Current Immunizations     13-VALENT PCV PREVNAR  Incomplete    SHINGLES VACCINE 12/1/2014    Tdap Vaccine  Incomplete      Below and/or attached are the medications your provider expects you to take. Review all of your home medications and newly ordered medications with your provider and/or pharmacist. Follow medication instructions as directed by your provider and/or pharmacist. Please keep your medication list with you and share with your provider. Update the information when medications are discontinued, doses are changed, or new medications (including over-the-counter products) are added; and carry medication information at all times in the event of emergency situations     Allergies:  BEE - (reactions not documented)               Medications  Valid as of: April 06, 2017 -  2:18 PM    Generic Name Brand Name Tablet Size Instructions for use    Albuterol Sulfate (Nebu Soln)  PROVENTIL 2.5mg/3ml 2.5 mg by Nebulization route every four hours as needed for Shortness of Breath.        Albuterol Sulfate (Aero Soln) albuterol 108 (90 BASE) MCG/ACT INHALE TWO PUFFS BY MOUTH EVERY 6 HOURS AS NEEDED FOR SHORTNESS OF BREATH        Aspirin (Tab)  MG Take 325 mg by mouth every 6 hours as needed.        Baclofen (Tab) LIORESAL 10 MG Take 1 Tab by mouth 3 times a day.        BuPROPion HCl (TABLET SR 12 HR) WELLBUTRIN- MG TAKE ONE TABLET BY MOUTH TWICE A DAY        Cholecalciferol (Cap) Vitamin D3 2000 UNIT Take 1 Cap by mouth every day.        Clopidogrel Bisulfate (Tab) PLAVIX 75 MG Take 1 Tab by mouth every day.        Escitalopram Oxalate (Tab) LEXAPRO 20 MG TAKE ONE TABLET BY MOUTH ONE TIME A DAY        Estrogens Conjugated (Tab) PREMARIN 0.625 MG TAKE ONE TABLET BY MOUTH DAILY        Ferrous Sulfate (Tab) ferrous sulfate 325 (65 FE) MG Take 1 Tab by mouth every day.        Fluticasone-Salmeterol (AEROSOL POWDER, BREATH ACTIVATED) ADVAIR 100-50 MCG/DOSE Inhale 1 Puff by mouth every 12 hours.        Formoterol Fumarate (Nebu Soln) PERFOROMIST 20 MCG/2ML Inhale 20 mcg by mouth.        Gabapentin (Tab) NEURONTIN 600 MG Take 600 mg by mouth 2 times a day.        Lisinopril (Tab) PRINIVIL 10 MG TAKE ONE TABLET BY MOUTH ONCE A DAY        Multiple Vitamins-Minerals (Tab) WOMENS MULTI VITAMIN & MINERAL  Take  by mouth every day.        NON SPECIFIED   O2 sat at 84% on room air in office, needs O2 24/7 and would like portable concentrator        Olopatadine HCl (Solution) PATANOL 0.1 % PLACE 1 DROP IN EACH EYE TWO TIMES A DAY        Ondansetron (TABLET DISPERSIBLE) ZOFRAN ODT 4 MG Take 1 Tab by mouth every 8 hours as needed for Nausea/Vomiting.        Oxycodone-Acetaminophen (Tab) PERCOCET-10  MG Take 1 Tab by mouth 2 times a day as needed for Severe Pain (2-3 times daily).        Pravastatin Sodium (Tab) PRAVACHOL 20 MG Take 1 Tab by mouth every bedtime.        PredniSONE (Tab)  DELTASONE 20 MG Take 40 mg by mouth every day.        Tiotropium Bromide Monohydrate (Cap) SPIRIVA 18 MCG Inhale 18 mcg by mouth every day.        TraZODone HCl (Tab) DESYREL 100 MG Take 2 Tabs by mouth at bedtime as needed for Sleep. AS NEEDED FOR SLEEP        .                 Medicines prescribed today were sent to:     [facility] - [place], [place] - [address]    [address] [place] [address]    Phone: [phone] Fax: [phone]    Open 24 Hours?: No      Medication refill instructions:       If your prescription bottle indicates you have medication refills left, it is not necessary to call your provider’s office. Please contact your pharmacy and they will refill your medication.    If your prescription bottle indicates you do not have any refills left, you may request refills at any time through one of the following ways: The online [employer] system (except Urgent Care), by calling your provider’s office, or by asking your pharmacy to contact your provider’s office with a refill request. Medication refills are processed only during regular business hours and may not be available until the next business day. Your provider may request additional information or to have a follow-up visit with you prior to refilling your medication.   *Please Note: Medication refills are assigned a new Rx number when refilled electronically. Your pharmacy may indicate that no refills were authorized even though a new prescription for the same medication is available at the pharmacy. Please request the medicine by name with the pharmacy before contacting your provider for a refill.        Your To Do List     Future Labs/Procedures Complete By Expires    [id number]  As directed 4/6/2018    [facility] PAIN MANAGEMENT SCREEN  As directed 4/6/2018    Comments:    Current Meds (name, sig, last dose):   Current outpatient prescriptions:   •  trazodone, 200 mg, Oral, HS PRN  •   fluticasone-salmeterol, 1 Puff, Inhalation, Q12HRS  •  albuterol, INHALE TWO PUFFS BY MOUTH EVERY 6 HOURS AS NEEDED FOR SHORTNESS OF BREATH  •  Vitamin D3, 2,000 Units, Oral, DAILY  •  albuterol, 2.5 mg, Nebulization, Q4HRS PRN  •  formoterol fumarate, Inhale 20 mcg by mouth.  •  lisinopril, TAKE ONE TABLET BY MOUTH ONCE A DAY  •  conjugated estrogen, TAKE ONE TABLET BY MOUTH DAILY  •  baclofen, 10 mg, Oral, TID  •  escitalopram, TAKE ONE TABLET BY MOUTH ONE TIME A DAY  •  oxycodone-acetaminophen, 1 Tab, Oral, BID PRN  •  ferrous sulfate, 325 mg, Oral, DAILY  •  aspirin, 325 mg, Oral, Q6HRS PRN  •  buPROPion SR, TAKE ONE TABLET BY MOUTH TWICE A DAY  •  gabapentin, 600 mg, Oral, BID  •  olopatadine, PLACE 1 DROP IN EACH EYE TWO TIMES A DAY  •  clopidogrel, 75 mg, Oral, DAILY  •  ondansetron, 4 mg, Oral, Q8HRS PRN, prn  •  NON SPECIFIED, O2 sat at 84% on room air in office, needs O2 24/7 and would like portable concentrator  •  WOMENS MULTI VITAMIN & MINERAL, Take  by mouth every day.  •  pravastatin, 20 mg, Oral, QHS  •  predniSONE, 40 mg, Oral, DAILY  •  tiotropium, 18 mcg, Inhalation, DAILY, 8/24/2016            Referral     A referral request has been sent to our patient care coordination department. Please allow 3-5 business days for us to process this request and contact you either by phone or mail. If you do not hear from us by the 5th business day, please call us at [phone].        Instructions    I have placed a referral for Pain management and Psych at your appointment today.   You should receive a phone call or letter from [facility] stating your Referral has been completed. At that time, please contact the office you have been referred to in order to make an appointment.   If you do not hear from our office within 1-2 weeks, please contact Larry at [phone].      PLEASE BRING YOU MEDICATIONS WITH YOU TO YOUR NEXT APPOINTMENT     Your medical care was provided today by: Jamie Griffin  JULISSA    Thank You for the opportunity to serve you.    You may receive a brief survey in the mail shortly regarding your visit today. Please take a few moments to complete the survey and return it; no postage is necessary. We are working to serve our patient population better, improve customer service and our patients overall experience and your input can help us to accomplish this. We thank you for your help and for the opportunity to serve you today and in the future.     Special Instructions:  Always call 9-1-1 immediately if you develop a life threatening emergency.    Unless told otherwise please take all medications as directed and complete prescription therapies.     Watch for the following signs that require additional evaluation: progressive lethargy or unresponsiveness, localized pain (chest, abdomen), shortness of breath, painful breathing, progressive vomiting with weakness, bloody stools, or new rash.     If you are prescribed pain medication or any other medication that is sedating, do not take medication before or while operating a vehicle or heavy machinery or equipment due to potential side effects such as drowsiness and/or dizziness.           Pickie Access Code: [email or web address]  Expires: 5/6/2017  2:18 PM    Pickie  A secure, online tool to manage your health information     [employer]’s Pickie® is a secure, online tool that connects you to your personalized health information from the privacy of your home -- day or night - making it very easy for you to manage your healthcare. Once the activation process is completed, you can even access your medical information using the Pickie fanny, which is available for free in the [employer] Fanny store or Google Play store.     Pickie provides the following levels of access (as shown below):   My Chart Features   [facility] Doctor [facility]  Specialists [facility]  Urgent  Care Non-[facility]  Primary Care  Doctor   Email your healthcare team securely and  privately 24/7 X X X    Manage appointments: schedule your next appointment; view details of past/upcoming appointments X      Request prescription refills. X      View recent personal medical records, including lab and immunizations X X X X   View health record, including health history, allergies, medications X X X X   Read reports about your outpatient visits, procedures, consult and ER notes X X X X   See your discharge summary, which is a recap of your hospital and/or ER visit that includes your diagnosis, lab results, and care plan. X X       How to register for [email or web address]:  1. Go to  [email or web address].  2. Click on the Sign Up Now box, which takes you to the New Member Sign Up page. You will need to provide the following information:  a. Enter your [email or web address] Access Code exactly as it appears at the top of this page. (You will not need to use this code after you’ve completed the sign-up process. If you do not sign up before the expiration date, you must request a new code.)   b. Enter your date of birth.   c. Enter your home email address.   d. Click Submit, and follow the next screen’s instructions.  3. Create a Chunnel[employer]TV ID. This will be your [employer] login ID and cannot be changed, so think of one that is secure and easy to remember.  4. Create a [employer] password. You can change your password at any time.  5. Enter your Password Reset Question and Answer. This can be used at a later time if you forget your password.   6. Enter your e-mail address. This allows you to receive e-mail notifications when new information is available in [email or web address].  7. Click Sign Up. You can now view your health information.    For assistance activating your [email or web address] account, call [phone]

## 2017-04-06 NOTE — ASSESSMENT & PLAN NOTE
Patient reports she was followed up by dermatology, lesion was cut from her face. Reports it was benign. She has no follow-up at this time. Appears to be resolved.

## 2017-04-06 NOTE — PROGRESS NOTES
"Chief Complaint   Patient presents with   • Medication Refill   • Anxiety     wants something to calm her down        Sarah Rosas is a 72 y.o. female here today to establish care and to discuss the evaluation and management of:    HPI:     Left hip pain  Per her report, in 2015, she broke her hip. She did end up having a hip replacement. She was seeing Esteban Ovied[facility]. She does follow up with them periodically.     Low back pain  She reports she has seen specialists, no surgery. She has had several MRIs. Reports that she has history of a cyst on her spine. We do not appear to have any of this information her chart.    Pain management contract agreement  Daughter abuse alcohol   Previous pain clinic discharged her, possible amph and alcohol use, denies now.   No history herself, denies   Dr. Hackett ?     Anxiety  Patient reports increased anxiety recently since her daughter passed away. She reports her daughter passed away from alcohol overdose, although this is unclear based on her report. She has been to see psychiatry in the past, but not in several years. Denies SI/HI.     Bipolar affect, depressed  Per patient she reports that she knows her \" up and downs. \" It is unclear which medication she is taking, she does not have her prescription bottles with her today and her history of reporting is poor.    Chronic respiratory failure with hypoxia (CMS-HCC)  Patient is on continuous 2 L O2.     Chronically on opiate therapy  Patient has been taking oxycodone for several years, at one point she was followed by pain management. Per her report she was discharged from pain management due to an abnormal drug screen which included alcohol and amphetamines.    COPD (chronic obstructive pulmonary disease)  It is unclear which medications patient is taking. She does appear to have as needed albuterol use. She does not pulmonologist. Advised patient to bring her medications in to next visit. She does have " prednisone listed on her medication list, she is unsure if she is taking daily.    Essential hypertension  Patient appears stable, although it is unclear which medication she is currently taking. Her blood pressure today is 120/78. She denies chest pain.    Moderate episode of recurrent major depressive disorder (CMS-HCC)  Please see anxiety note.     Skin lesion of face  Patient reports she was followed up by dermatology, lesion was cut from her face. Reports it was benign. She has no follow-up at this time. Appears to be resolved.      Current medicines (including changes today)  Current Outpatient Prescriptions   Medication Sig Dispense Refill   • oxycodone-acetaminophen (PERCOCET-10)  MG Tab Take 1 Tab by mouth 2 times a day as needed for Severe Pain (2-3 times daily). 60 Tab 0   • trazodone (DESYREL) 100 MG Tab Take 2 Tabs by mouth at bedtime as needed for Sleep. AS NEEDED FOR SLEEP 60 Tab 2   • fluticasone-salmeterol (ADVAIR DISKUS) 100-50 MCG/DOSE AEROSOL POWDER, BREATH ACTIVATED Inhale 1 Puff by mouth every 12 hours. 1 Inhaler 2   • albuterol (PROAIR HFA) 108 (90 BASE) MCG/ACT Aero Soln inhalation aerosol INHALE TWO PUFFS BY MOUTH EVERY 6 HOURS AS NEEDED FOR SHORTNESS OF BREATH 1 Inhaler 1   • Cholecalciferol (VITAMIN D3) 2000 UNIT Cap Take 1 Cap by mouth every day. 30 Cap 3   • albuterol (PROVENTIL) 2.5mg/3ml Nebu Soln solution for nebulization 2.5 mg by Nebulization route every four hours as needed for Shortness of Breath.     • formoterol fumarate (PERFOROMIST) 20 MCG/2ML Nebu Soln Inhale 20 mcg by mouth.     • lisinopril (PRINIVIL) 10 MG Tab TAKE ONE TABLET BY MOUTH ONCE A DAY 90 Tab 1   • conjugated estrogen (PREMARIN) 0.625 MG Tab TAKE ONE TABLET BY MOUTH DAILY 90 Tab 1   • baclofen (LIORESAL) 10 MG Tab Take 1 Tab by mouth 3 times a day. 90 Tab 0   • escitalopram (LEXAPRO) 20 MG tablet TAKE ONE TABLET BY MOUTH ONE TIME A DAY 90 Tab 1   • ferrous sulfate 325 (65 FE) MG tablet Take 1 Tab by mouth  every day. 30 Tab 2   • aspirin (ASA) 325 MG Tab Take 325 mg by mouth every 6 hours as needed.     • buPROPion SR (WELLBUTRIN-SR) 150 MG TABLET SR 12 HR sustained-release tablet TAKE ONE TABLET BY MOUTH TWICE A DAY 60 Tab 0   • gabapentin (NEURONTIN) 600 MG tablet Take 600 mg by mouth 2 times a day.     • olopatadine (PATANOL) 0.1 % ophthalmic solution PLACE 1 DROP IN EACH EYE TWO TIMES A DAY 1 Bottle 2   • clopidogrel (PLAVIX) 75 MG Tab Take 1 Tab by mouth every day. 30 Tab 6   • ondansetron (ZOFRAN ODT) 4 MG TABLET DISPERSIBLE Take 1 Tab by mouth every 8 hours as needed for Nausea/Vomiting. 10 Tab 0   • NON SPECIFIED O2 sat at 84% on room air in office, needs O2 24/7 and would like portable concentrator 1 Each 0   • Multiple Vitamins-Minerals (WOMENS MULTI VITAMIN & MINERAL) TABS Take  by mouth every day.     • pravastatin (PRAVACHOL) 20 MG TABS Take 1 Tab by mouth every bedtime. 90 Tab 3   • predniSONE (DELTASONE) 20 MG TABS Take 40 mg by mouth every day.     • tiotropium (SPIRIVA) 18 MCG CAPS Inhale 18 mcg by mouth every day.       No current facility-administered medications for this visit.       She  has a past medical history of Back pain; COPD (chronic obstructive pulmonary disease) (CMS-HCC); Bipolar affect, depressed (CMS-HCC) (6/23/2012); and Anxiety and depression (4/2/2015).    She  has past surgical history that includes hysterectomy, total abdominal; endometrial ablation; appendectomy; and hip nailing intramedullary (Left).     Social History   Substance Use Topics   • Smoking status: Former Smoker -- 25 years     Types: Cigarettes   • Smokeless tobacco: Never Used      Comment: Quit March 2015   • Alcohol Use: 0.5 oz/week     1 Shots of liquor per week       Social History     Social History Narrative       Family History   Problem Relation Age of Onset   • Heart Disease Father 61   • Other Sister    • Heart Disease Sister      Pacemaker       Family Status   Relation Status Death Age   • Father  "    • Mother     • Sister     • Brother Alive    • Sister     • Sister Alive    • Sister Alive    • Sister Alive        ROS   Constitutional: Denies fever, chills, or sweats  Eyes: negative for visual blurring, double vision, eye pain, floaters and discharge from eyes  ENT: negative for tinnitus, vertigo, frequent URI's, sinus trouble, persistent sore throat  Respiratory: negative for persistent cough, hemoptysis, dyspnea, wheezing  Cardiovascular: negative for palpitations, chest pain, or peripheral edema.  Gastrointestinal: negative for poor appetite, dysphagia, nausea, heartburn, abdominal pain, hemorrhoids, constipation or diarrhea  Genitourinary: negative for dysuria.   Musculoskeletal: negative for joint swelling. Positive for chronic left hip pain, low back pain.  Skin: negative for rash, scaling, hair or nail changes.  Neurologic: negative for migraine headaches, involuntary movements or tremor  Psychiatric: Patient denies any history of drug or alcohol abuse. Positive for anxiety and depression.No SI/HI/   Hematologic/Lymphatic/Immunologic: negative for unusual bruising, swollen glands  Endocrine: negative for temperature intolerance, polydipsia, polyuria, unintentional weight changes.        Objective:     Blood pressure 120/78, pulse 89, temperature 36.8 °C (98.3 °F), resp. rate 18, height 1.575 m (5' 2.01\"), weight 54.885 kg (121 lb), SpO2 92 %. Body mass index is 22.13 kg/(m^2).    Physical Exam:   Constitutional: Alert, no distress. On continuous oxygen.  Eye: Equal, round and reactive, conjunctiva clear, lids normal.  ENMT: Lips without lesions, oropharynx clear.  Neck: Trachea midline, no masses, no thyromegaly. No cervical or supraclavicular lymphadenopathy.   Respiratory: Unlabored respiratory effort, lungs clear to auscultation, no wheezes, no ronchi.  Cardiovascular: Normal S1, S2, no murmur, no edema. Capillary refill < 2 seconds in UE bilaterally.   Skin: Warm, " dry, good turgor, no rashes in visible areas.  Neuro: normal sensation in extremities.   Psych: Alert and oriented x3, normal affect and mood.      Assessment and Plan:   The following treatment plan was discussed    1. Establishing care with new doctor, encounter for    2. Bipolar affective disorder, current episode depressed, current episode severity unspecified (CMS-[facility])  Allergies unclear which medications patient is currently taking. Due to the increased stress in her life, she would be a great candidate to see psychiatry. I placed a referral. Discussed signs and symptoms seek emergent care. Encouraged patient to bring her medication bottles to her next appointment so we may have proper reconciliation.  - REFERRAL TO PSYCHIATRY    3. Moderate episode of recurrent major depressive disorder (CMS-HCC)  - REFERRAL TO PSYCHIATRY    4. Anxiety  - REFERRAL TO PSYCHIATRY    5. Chronic obstructive pulmonary disease, unspecified COPD type (CMS-[facility])  At this time patient appears stable although I'm unsure which medication she is using currently.    6. Essential hypertension  At this time patient appears stable although I'm unsure which medication she is using currently.    7. Left hip pain  See below.   - REFERRAL TO PAIN CLINIC    8. Chronic bilateral low back pain without sciatica  See below.   - REFERRAL TO PAIN CLINIC    9. Skin lesion of face  Appears resolved.    10. Tobacco abuse    11. Chronic respiratory failure with hypoxia (CMS-[facility])    12. Pain management contract agreement  Considering patient's reported history of being discharged from a pain clinic, patient also reports that her daughter recently  from alcohol abuse. While she adamantly denies any alcohol or drug abuse, she appears to be high risk patient. I have referred patient to pain clinic and discussed at length the risks of chronic opiate therapy, to include death. Patient to complete urine drug screen and controlled substance agreement today as I  will bridge her medication until she is able to see pain management. Have decreased her number of pills to 60. Also encouraged patient to complete release of records to obtain prior records of hip replacement and low back pain management with [facility]. Reviewed , no concerns.   - REFERRAL TO PAIN CLINIC  - [facility] PAIN [facility]NAG[facility] SCREEN; Future  - Controlled Substance Treatment Agreement    13. Chronically on opiate therapy  - [facility] PAIN MANAGEMENT SCREEN; Future    14. Screening for breast cancer  - [id number]; Future    15. Need for vaccination  - Tdap =>8yo IM  - PNEUMOCOCCAL CONJUGATE VACCINE 13-VALENT       Reviewed indication, dosage, usage and potential adverse effects of prescribed medications. Patient appears to understand, verbalizes understanding and is willing to try medications as prescribed.      Discussed at length with patient use of non-pharmacological treatments as adjuncts for current pain medicine. Advised prescription is a controlled substance which is potentially habit-forming. Its use is regulated by the [place]. It must be submitted to the pharmacy within 5 days of the date written and can not be called in or faxed to the pharmacy. Any refill requires a new prescription that must be obtained from this office during regular office hours. This medicine can cause nausea, significant constipation, sedation, confusion. I have advised patient to keep medication in a safe place and to not drive with medication.    I reviewed risks, side effects, and interactions of medications, including over-the-counter medications. I recommend avoid use of benzodiazepines due to risk of interaction with opioid analgesics. I advise the patient to avoid alcohol and marijuana use or any illicits with prescribed medication. I reviewed the controlled substance prescribing program, including the risks of operating any vehicle or machinery with use of the prescribed medications, and adverse  affects such as dependence, tolerance, addiction, and withdrawal with use of opioid analgesics and/or benzodiazepines.    Reviewed risks and benefits of treatment plan. Patient verbally agrees to plan of care.     Records requested.    Followup: Return in about 1 month (around 5/6/2017).    JERED Casiano.     PLEASE NOTE: This dictation was created using voice recognition software. I have made every reasonable attempt to correct obvious errors, but I expect that there may be errors of grammar and possibly content that I did not discover prior finalizing this note.

## 2017-04-06 NOTE — ASSESSMENT & PLAN NOTE
Patient reports increased anxiety recently since her daughter passed away. She reports her daughter passed away from alcohol overdose, although this is unclear based on her report. She has been to see psychiatry in the past, but not in several years. Denies SI/HI.

## 2017-04-06 NOTE — ASSESSMENT & PLAN NOTE
Daughter abuse alcohol   Previous pain clinic discharged her, possible amph and alcohol use, denies now.   No history herself, denies   Dr. Hackett ?

## 2017-04-06 NOTE — ASSESSMENT & PLAN NOTE
She reports she has seen specialists, no surgery. She has had several MRIs. Reports that she has history of a cyst on her spine. We do not appear to have any of this information her chart.

## 2017-04-06 NOTE — ASSESSMENT & PLAN NOTE
Patient has been taking oxycodone for several years, at one point she was followed by pain management. Per her report she was discharged from pain management due to an abnormal drug screen which included alcohol and amphetamines.

## 2017-04-06 NOTE — ASSESSMENT & PLAN NOTE
"Per patient she reports that she knows her \" up and downs. \" It is unclear which medication she is taking, she does not have her prescription bottles with her today and her history of reporting is poor.  "

## 2017-04-06 NOTE — ASSESSMENT & PLAN NOTE
Per her report, in 2015, she broke her hip. She did end up having a hip replacement. She was seeing Esteban Oviedo. She does follow up with them periodically.

## 2017-04-06 NOTE — PATIENT INSTRUCTIONS
I have placed a referral for Pain management and Psych at your appointment today.   You should receive a phone call or letter from [facility] stating your Referral has been completed. At that time, please contact the office you have been referred to in order to make an appointment.   If you do not hear from our office within 1-2 weeks, please contact Larry at [phone].      PLEASE BRING YOU MEDICATIONS WITH YOU TO YOUR NEXT APPOINTMENT     Your medical care was provided today by: JULISSA Reza    Thank You for the opportunity to serve you.    You may receive a brief survey in the mail shortly regarding your visit today. Please take a few moments to complete the survey and return it; no postage is necessary. We are working to serve our patient population better, improve customer service and our patients overall experience and your input can help us to accomplish this. We thank you for your help and for the opportunity to serve you today and in the future.     Special Instructions:  Always call 9-1-1 immediately if you develop a life threatening emergency.    Unless told otherwise please take all medications as directed and complete prescription therapies.     Watch for the following signs that require additional evaluation: progressive lethargy or unresponsiveness, localized pain (chest, abdomen), shortness of breath, painful breathing, progressive vomiting with weakness, bloody stools, or new rash.     If you are prescribed pain medication or any other medication that is sedating, do not take medication before or while operating a vehicle or heavy machinery or equipment due to potential side effects such as drowsiness and/or dizziness.

## 2017-04-07 NOTE — TELEPHONE ENCOUNTER
Patient came in this evening to leave a sample for her pain medication.  Patient was unable to leave a sample and left during the process.  Called [facility] pharmacy, who has the scripts on hold, and asked them to not fill.

## 2017-04-24 ENCOUNTER — TELEPHONE (OUTPATIENT)
Dept: MEDICAL GROUP | Facility: CLINIC | Age: 72
End: 2017-04-24

## 2017-04-24 NOTE — TELEPHONE ENCOUNTER
1. Caller Name: Sarah                      Call Back Number: [phone] (home)     2. Message: Patient called in to let us know that you wrote a prescription for percocet that was not the amount she typically gets.  She states Tanya wrote her for 75 - you wrote for 60.  She is wondering if this is accurate or can you send her an rx for the rest of the 15? I told her that the directions were to take 2/day as needed - she states she has been taking an extra at night sometimes so she might be out before her next refill is due.     3. Patient approves office to leave a detailed voicemail/CLAREDhart message: N\A

## 2017-04-25 NOTE — TELEPHONE ENCOUNTER
This is not an error. This is a reflection of what she reported taking the medication and I am decreasing meds. Her referral to pain clinic has been processed, patient should contact Dr. Wilkins's office in [place] for an appointment. It also appears she never left a urine sample for UDS?  JULISSA Reza

## 2017-04-27 NOTE — TELEPHONE ENCOUNTER
Called patient she is very upset about her being decreased, she said we don't have the right to do that.  I ask her about her referral to pain management, she said the won't take her because she has been discharged from a pain doctor in the past.  She said she will call the doctor that did her surgery. I told her that would be a great idea.

## 2017-04-27 NOTE — TELEPHONE ENCOUNTER
Thank you for speaking with the patient. She can also make an appointment with me if she has further questions. She is also welcome to speak with our  Yolette Blankenship.   JULISSA Reza

## 2017-05-10 ENCOUNTER — TELEPHONE (OUTPATIENT)
Dept: MEDICAL GROUP | Facility: CLINIC | Age: 72
End: 2017-05-10

## 2017-05-10 NOTE — TELEPHONE ENCOUNTER
Pt asked about her pain med refill, she is scheduled to see Dr. Wilkins on 8/25/17. She is scheduled this Friday to go over Mammo results, she did not get this done, but states she is due for a pain med refill. Please advise.

## 2017-05-11 NOTE — TELEPHONE ENCOUNTER
Did she ever leave a urine sample? I have a note that she did not, however, it does state collected on 4/6/17? She can keep her appointment on Friday to discuss pain.   JULISSA Reza

## 2017-05-16 ENCOUNTER — TELEPHONE (OUTPATIENT)
Dept: MEDICAL GROUP | Facility: CLINIC | Age: 72
End: 2017-05-16

## 2017-05-17 NOTE — TELEPHONE ENCOUNTER
Advised patient to complete UDS on 4/6/17  Did not complete until 5/11/17  Consistent results, however, did not comply with UDS request.   Jamie Griffin, APRN

## 2017-05-18 NOTE — TELEPHONE ENCOUNTER
Pt. Informed. Scheduled with you tomorrow at 10:20. She will be out on Sunday per pt.     She said it took over a month because she submitted 3 samples to you. 1st time wasn't enough. 2nd time she left in the window. And then this last time.

## 2017-05-18 NOTE — TELEPHONE ENCOUNTER
Pt. Called requesting refill. What would you like me to inform patient of.     Stated she spoke with Nithya but no documentation in chart.

## 2017-05-18 NOTE — TELEPHONE ENCOUNTER
She will need an appointment to discuss. Please inquire why it took patient over 1 month to complete testing?   JULISSA Reza

## 2017-05-19 ENCOUNTER — OFFICE VISIT (OUTPATIENT)
Dept: MEDICAL GROUP | Facility: CLINIC | Age: 72
End: 2017-05-19
Payer: MEDICARE

## 2017-05-19 VITALS
SYSTOLIC BLOOD PRESSURE: 126 MMHG | DIASTOLIC BLOOD PRESSURE: 62 MMHG | TEMPERATURE: 98.5 F | HEIGHT: 62 IN | OXYGEN SATURATION: 96 % | HEART RATE: 87 BPM | WEIGHT: 120 LBS | BODY MASS INDEX: 22.08 KG/M2

## 2017-05-19 DIAGNOSIS — G89.29 EXACERBATION OF CHRONIC BACK PAIN: ICD-10-CM

## 2017-05-19 DIAGNOSIS — H01.119 EYELID DERMATITIS, ALLERGIC/CONTACT, UNSPECIFIED LATERALITY: ICD-10-CM

## 2017-05-19 DIAGNOSIS — J44.9 CHRONIC OBSTRUCTIVE PULMONARY DISEASE, UNSPECIFIED COPD TYPE (HCC): ICD-10-CM

## 2017-05-19 DIAGNOSIS — Z79.891 CHRONICALLY ON OPIATE THERAPY: ICD-10-CM

## 2017-05-19 DIAGNOSIS — Z91.81 HISTORY OF FALL: ICD-10-CM

## 2017-05-19 DIAGNOSIS — G89.29 CHRONIC BILATERAL LOW BACK PAIN WITHOUT SCIATICA: ICD-10-CM

## 2017-05-19 DIAGNOSIS — M54.50 CHRONIC BILATERAL LOW BACK PAIN WITHOUT SCIATICA: ICD-10-CM

## 2017-05-19 DIAGNOSIS — M25.552 LEFT HIP PAIN: ICD-10-CM

## 2017-05-19 DIAGNOSIS — M54.9 EXACERBATION OF CHRONIC BACK PAIN: ICD-10-CM

## 2017-05-19 DIAGNOSIS — Z02.89 PAIN MANAGEMENT CONTRACT AGREEMENT: ICD-10-CM

## 2017-05-19 DIAGNOSIS — J96.11 CHRONIC RESPIRATORY FAILURE WITH HYPOXIA (HCC): ICD-10-CM

## 2017-05-19 PROCEDURE — 1100F PTFALLS ASSESS-DOCD GE2>/YR: CPT | Performed by: NURSE PRACTITIONER

## 2017-05-19 PROCEDURE — 0518F FALL PLAN OF CARE DOCD: CPT | Mod: 8P | Performed by: NURSE PRACTITIONER

## 2017-05-19 PROCEDURE — G8432 DEP SCR NOT DOC, RNG: HCPCS | Performed by: NURSE PRACTITIONER

## 2017-05-19 PROCEDURE — 3288F FALL RISK ASSESSMENT DOCD: CPT | Mod: 8P | Performed by: NURSE PRACTITIONER

## 2017-05-19 PROCEDURE — 3017F COLORECTAL CA SCREEN DOC REV: CPT | Mod: 8P | Performed by: NURSE PRACTITIONER

## 2017-05-19 PROCEDURE — 4040F PNEUMOC VAC/ADMIN/RCVD: CPT | Performed by: NURSE PRACTITIONER

## 2017-05-19 PROCEDURE — 99213 OFFICE O/P EST LOW 20 MIN: CPT | Performed by: NURSE PRACTITIONER

## 2017-05-19 PROCEDURE — 1036F TOBACCO NON-USER: CPT | Performed by: NURSE PRACTITIONER

## 2017-05-19 PROCEDURE — 3014F SCREEN MAMMO DOC REV: CPT | Mod: 8P | Performed by: NURSE PRACTITIONER

## 2017-05-19 PROCEDURE — G8420 CALC BMI NORM PARAMETERS: HCPCS | Performed by: NURSE PRACTITIONER

## 2017-05-19 RX ORDER — OXYCODONE AND ACETAMINOPHEN 10; 325 MG/1; MG/1
1 TABLET ORAL 2 TIMES DAILY PRN
Qty: 75 TAB | Refills: 0 | Status: SHIPPED | OUTPATIENT
Start: 2017-05-19 | End: 2017-08-21 | Stop reason: SDUPTHER

## 2017-05-19 RX ORDER — OXYCODONE AND ACETAMINOPHEN 10; 325 MG/1; MG/1
1 TABLET ORAL 2 TIMES DAILY PRN
Qty: 75 TAB | Refills: 0 | Status: SHIPPED | OUTPATIENT
Start: 2017-05-19 | End: 2017-05-19 | Stop reason: SDUPTHER

## 2017-05-19 RX ORDER — OLOPATADINE HYDROCHLORIDE 1 MG/ML
SOLUTION/ DROPS OPHTHALMIC
Qty: 1 BOTTLE | Refills: 2 | Status: SHIPPED | OUTPATIENT
Start: 2017-05-19 | End: 2018-02-09 | Stop reason: SDUPTHER

## 2017-05-19 RX ORDER — IPRATROPIUM BROMIDE 21 UG/1
2 SPRAY, METERED NASAL EVERY 12 HOURS
Qty: 1 BOTTLE | Refills: 1 | Status: SHIPPED | OUTPATIENT
Start: 2017-05-19 | End: 2017-07-20 | Stop reason: SDUPTHER

## 2017-05-19 NOTE — MR AVS SNAPSHOT
"        Sarah Almendarez Snowball   2017 10:20 AM   Office Visit   MRN: [id number]    [facility] Phone:  [phone]    Description:  Female : 1945   Provider:  MARICEL Casiano           Reason for Visit     Medication Refill oxycodone, percocet, patonol    Nasal Congestion possible nasal spray, used over the counter in the past and not working      Allergies as of 2017     Allergen Noted Reactions    Bee 2010         You were diagnosed with     Pain management contract agreement   [[id number]]       Chronic bilateral low back pain without sciatica   [3671065]       Exacerbation of chronic back pain   [094866]       History of fall   [273335]       Chronically on opiate therapy   [9828651]       Eyelid dermatitis, allergic/contact, unspecified laterality   [216159]       Chronic obstructive pulmonary disease, unspecified COPD type (CMS-HCC)   [2038763]       Chronic respiratory failure with hypoxia (CMS-[facility])   [967372]       Left hip pain   [899099]         Vital Signs     Blood Pressure Pulse Temperature Height Weight Body Mass Index    126/62 mmHg 87 36.9 °C (98.5 °F) 1.575 m (5' 2.01\") 54.432 kg (120 lb) 21.94 kg/m2    Oxygen Saturation Smoking Status                96% Former Smoker          Basic Information     Date Of Birth Sex Race Ethnicity Preferred Language    1945 Female White Non- English      Your appointments     May 25, 2017  9:00 AM   Telemedicine Clinic [facility] with Reji Allen M.D., [facility] [place]   [facility] [address] [place] [address])    [address] [place] [address]  [address]  [facility] [address]   [phone]            Aug 25, 2017 10:15 AM   New Patient with Mat Wilkins M.D.   [facility] (--)    [address]  [facility] [id number] [phone]           Please bring Photo ID, Insurance Cards, All Medication Bottles and copies of any legal documents (such " as Living Will, Power of ) If speaking a language besides English please bring an adult . Please arrive 30 minutes prior for check in and registration. You will be receiving a confirmation call a few days before your appointment from our automated call confirmation system.              Problem List              ICD-10-CM Priority Class Noted - Resolved    COPD (chronic obstructive pulmonary disease) (CMS-HCC) J44.9   Unknown - Present    Tobacco abuse Z72.0   9/7/2011 - Present    Bipolar affect, depressed (CMS-HCC) F31.30   6/23/2012 - Present    Essential hypertension I10   4/18/2013 - Present    Low back pain M54.5   1/23/2014 - Present    Moderate episode of recurrent major depressive disorder (CMS-HCC) F33.1   4/2/2015 - Present    Anxiety F41.9   5/20/2015 - Present    Chronic respiratory failure with hypoxia (CMS-HCC) J96.11   6/8/2016 - Present    Skin lesion of face L98.9   8/25/2016 - Present    Iron deficiency anemia D50.9   1/25/2017 - Present    Chronic pain G89.29   2/23/2017 - Present    Left hip pain M25.552   4/6/2017 - Present    Pain management contract agreement Z02.89   4/6/2017 - Present    Chronically on opiate therapy Z79.891   4/6/2017 - Present    Exacerbation of chronic back pain M54.9, G89.29   5/19/2017 - Present    History of fall Z91.81   5/19/2017 - Present      Health Maintenance        Date Due Completion Dates    MAMMOGRAM 7/16/2016 7/16/2015, 1/16/2014    IMM PNEUMOCOCCAL 65+ (ADULT) LOW/MEDIUM RISK SERIES (2 of 2 - PPSV23) 4/6/2018 4/6/2017    BONE DENSITY 1/24/2019 1/24/2014 (Prv Comp)    Override on 1/24/2014: Previously completed    COLONOSCOPY 1/14/2024 1/14/2014 (Declined), 8/13/2002    Override on 1/14/2014: Patient Declined    IMM DTaP/Tdap/Td Vaccine (2 - Td) 4/6/2027 4/6/2017            Current Immunizations     13-VALENT PCV PREVNAR 4/6/2017    SHINGLES VACCINE 12/1/2014    Tdap Vaccine 4/6/2017      Below and/or attached are the medications your  provider expects you to take. Review all of your home medications and newly ordered medications with your provider and/or pharmacist. Follow medication instructions as directed by your provider and/or pharmacist. Please keep your medication list with you and share with your provider. Update the information when medications are discontinued, doses are changed, or new medications (including over-the-counter products) are added; and carry medication information at all times in the event of emergency situations     Allergies:  BEE - (reactions not documented)               Medications  Valid as of: May 19, 2017 - 10:22 AM    Generic Name Brand Name Tablet Size Instructions for use    Albuterol Sulfate (Nebu Soln) PROVENTIL 2.5mg/3ml 2.5 mg by Nebulization route every four hours as needed for Shortness of Breath.        Albuterol Sulfate (Aero Soln) albuterol 108 (90 BASE) MCG/ACT INHALE TWO PUFFS BY MOUTH EVERY 6 HOURS AS NEEDED FOR SHORTNESS OF BREATH        Aspirin (Tab)  MG Take 325 mg by mouth every 6 hours as needed.        Baclofen (Tab) LIORESAL 10 MG Take 1 Tab by mouth 3 times a day.        BuPROPion HCl (TABLET SR 12 HR) WELLBUTRIN- MG TAKE ONE TABLET BY MOUTH TWICE A DAY        Cholecalciferol (Cap) Vitamin D3 2000 UNIT Take 1 Cap by mouth every day.        Clopidogrel Bisulfate (Tab) PLAVIX 75 MG Take 1 Tab by mouth every day.        Estrogens Conjugated (Tab) PREMARIN 0.625 MG TAKE ONE TABLET BY MOUTH DAILY        Ferrous Sulfate (Tab) ferrous sulfate 325 (65 FE) MG Take 1 Tab by mouth every day.        Fluticasone-Salmeterol (AEROSOL POWDER, BREATH ACTIVATED) ADVAIR 100-50 MCG/DOSE Inhale 1 Puff by mouth every 12 hours.        Formoterol Fumarate (Nebu Soln) PERFOROMIST 20 MCG/2ML Inhale 20 mcg by mouth.        Gabapentin (Tab) NEURONTIN 600 MG Take 600 mg by mouth 2 times a day.        Ipratropium Bromide (Solution) ATROVENT 0.03 % Spray 2 Sprays in nose every 12 hours.        Lisinopril  (Tab) PRINIVIL 10 MG TAKE ONE TABLET BY MOUTH ONCE A DAY        Multiple Vitamins-Minerals (Tab) WOMENS MULTI VITAMIN & MINERAL  Take  by mouth every day.        NON SPECIFIED   O2 sat at 84% on room air in office, needs O2 24/7 and would like portable concentrator        Olopatadine HCl (Solution) PATANOL 0.1 % PLACE 1 DROP IN EACH EYE TWO TIMES A DAY        Ondansetron (TABLET DISPERSIBLE) ZOFRAN ODT 4 MG Take 1 Tab by mouth every 8 hours as needed for Nausea/Vomiting.        Oxycodone-Acetaminophen (Tab) PERCOCET-10  MG Take 1 Tab by mouth 2 times a day as needed for Severe Pain (2-3 times daily).        Pravastatin Sodium (Tab) PRAVACHOL 20 MG Take 1 Tab by mouth every bedtime.        PredniSONE (Tab) DELTASONE 20 MG Take 40 mg by mouth every day.        Tiotropium Bromide Monohydrate (Cap) SPIRIVA 18 MCG Inhale 18 mcg by mouth every day.        TraZODone HCl (Tab) DESYREL 100 MG Take 2 Tabs by mouth at bedtime as needed for Sleep. AS NEEDED FOR SLEEP        .                 Medicines prescribed today were sent to:     [facility] - [address] [facility] [address]    Phone: [phone] Fax: [phone]    Open 24 Hours?: No      Medication refill instructions:       If your prescription bottle indicates you have medication refills left, it is not necessary to call your provider’s office. Please contact your pharmacy and they will refill your medication.    If your prescription bottle indicates you do not have any refills left, you may request refills at any time through one of the following ways: The online [employer] system (except Urgent Care), by calling your provider’s office, or by asking your pharmacy to contact your provider’s office with a refill request. Medication refills are processed only during regular business hours and may not be available until the next business day. Your provider may request additional information or to  have a follow-up visit with you prior to refilling your medication.   *Please Note: Medication refills are assigned a new Rx number when refilled electronically. Your pharmacy may indicate that no refills were authorized even though a new prescription for the same medication is available at the pharmacy. Please request the medicine by name with the pharmacy before contacting your provider for a refill.        Your To Do List     Future Labs/Procedures Complete By Expires    DX-HIP-BILATERAL-WITH PELVIS-2 VIEWS  As directed 5/19/2018    [facility] PAIN MANAGEMENT SCREEN  As directed 5/19/2018    Comments:    Current Meds (name, sig, last dose):   Current outpatient prescriptions:   •  oxycodone-acetaminophen, 1 Tab, Oral, BID PRN  •  trazodone, 200 mg, Oral, HS PRN  •  fluticasone-salmeterol, 1 Puff, Inhalation, Q12HRS  •  albuterol, INHALE TWO PUFFS BY MOUTH EVERY 6 HOURS AS NEEDED FOR SHORTNESS OF BREATH  •  Vitamin D3, 2,000 Units, Oral, DAILY  •  albuterol, 2.5 mg, Nebulization, Q4HRS PRN  •  formoterol fumarate, Inhale 20 mcg by mouth.  •  lisinopril, TAKE ONE TABLET BY MOUTH ONCE A DAY  •  conjugated estrogen, TAKE ONE TABLET BY MOUTH DAILY  •  baclofen, 10 mg, Oral, TID  •  ferrous sulfate, 325 mg, Oral, DAILY  •  aspirin, 325 mg, Oral, Q6HRS PRN  •  buPROPion SR, TAKE ONE TABLET BY MOUTH TWICE A DAY  •  gabapentin, 600 mg, Oral, BID  •  olopatadine, PLACE 1 DROP IN EACH EYE TWO TIMES A DAY  •  clopidogrel, 75 mg, Oral, DAILY  •  ondansetron, 4 mg, Oral, Q8HRS PRN  •  predniSONE, 40 mg, Oral, DAILY  •  WOMENS MULTI VITAMIN & MINERAL, Take  by mouth every day.  •  pravastatin, 20 mg, Oral, QHS  •  tiotropium, 18 mcg, Inhalation, DAILY  •  escitalopram, TAKE ONE TABLET BY MOUTH ONE TIME A DAY, not taking  •  NON SPECIFIED, O2 sat at 84% on room air in office, needs O2 24/7 and would like portable concentrator          MR-LUMBAR SPINE-W/O  As directed 5/19/2018      Instructions    IT IS VERY IMPORTANT TO KEEP  YOUR APPOINTMENT WITH PAIN MANAGEMENT - Get your imaging done before you see Dr. Wilkins     Keep your appointment with [facility]    Your medical care was provided today by: JULISSA Reza    Thank You for the opportunity to serve you.    You may receive a brief survey in the mail shortly regarding your visit today. Please take a few moments to complete the survey and return it; no postage is necessary. We are working to serve our patient population better, improve customer service and our patients overall experience and your input can help us to accomplish this. We thank you for your help and for the opportunity to serve you today and in the future.     Special Instructions:  Always call 9-1-1 immediately if you develop a life threatening emergency.    Unless told otherwise please take all medications as directed and complete prescription therapies.     Watch for the following signs that require additional evaluation: progressive lethargy or unresponsiveness, localized pain (chest, abdomen), shortness of breath, painful breathing, progressive vomiting with weakness, bloody stools, or new rash.     If you are prescribed pain medication or any other medication that is sedating, do not take medication before or while operating a vehicle or heavy machinery or equipment due to potential side effects such as drowsiness and/or dizziness.           Nerdies Access Code: [email or web address]  Expires: 6/18/2017 10:22 AM    Nerdies  A secure, online tool to manage your health information     [employer] Nerdies® is a secure, online tool that connects you to your personalized health information from the privacy of your home -- day or night - making it very easy for you to manage your healthcare. Once the activation process is completed, you can even access your medical information using the Nerdies fanny, which is available for free in the Apple Fanny store or Google Play store.     Nerdies provides the following levels  of access (as shown below):   My Chart Features   [facility] Doctor [facility]  Specialists [facility]  Urgent  Care Non-[facility]  Primary Care  Doctor   Email your healthcare team securely and privately 24/7 X X X    Manage appointments: schedule your next appointment; view details of past/upcoming appointments X      Request prescription refills. X      View recent personal medical records, including lab and immunizations X X X X   View health record, including health history, allergies, medications X X X X   Read reports about your outpatient visits, procedures, consult and ER notes X X X X   See your discharge summary, which is a recap of your hospital and/or ER visit that includes your diagnosis, lab results, and care plan. X X       How to register for Bitstamp:  1. Go to  [email or web address].  2. Click on the Sign Up Now box, which takes you to the New Member Sign Up page. You will need to provide the following information:  a. Enter your Bitstamp Access Code exactly as it appears at the top of this page. (You will not need to use this code after you’ve completed the sign-up process. If you do not sign up before the expiration date, you must request a new code.)   b. Enter your date of birth.   c. Enter your home email address.   d. Click Submit, and follow the next screen’s instructions.  3. Create a Bitstamp ID. This will be your Bitstamp login ID and cannot be changed, so think of one that is secure and easy to remember.  4. Create a Bitstamp password. You can change your password at any time.  5. Enter your Password Reset Question and Answer. This can be used at a later time if you forget your password.   6. Enter your e-mail address. This allows you to receive e-mail notifications when new information is available in Bitstamp.  7. Click Sign Up. You can now view your health information.    For assistance activating your Bitstamp account, call [phone]

## 2017-05-19 NOTE — ASSESSMENT & PLAN NOTE
This is a new problem. Patient reports that on 5/10 she as opening her back door, tripped and put her foot into a flower pot. Denies any LOC, head injury or syncope. Reports it has made her back hurt even more.

## 2017-05-19 NOTE — ASSESSMENT & PLAN NOTE
Per patient she is followed by Eduardo Trent, once yearly. This is different information that what she has told me at a prior visit. Will attempt to obtain records from their office.

## 2017-05-19 NOTE — ASSESSMENT & PLAN NOTE
Per her report, in 2015, she broke her hip. She did end up having a hip replacement. She was seeing Esteban Oviedo. She does follow up with them periodically. No recent imaging on file.

## 2017-05-19 NOTE — PATIENT INSTRUCTIONS
IT IS VERY IMPORTANT TO KEEP YOUR APPOINTMENT WITH PAIN MANAGEMENT - Get your imaging done before you see Dr. Wilkins     Keep your appointment with Psych Tele[facility]    Your medical care was provided today by: JULISSA Reza    Thank You for the opportunity to serve you.    You may receive a brief survey in the mail shortly regarding your visit today. Please take a few moments to complete the survey and return it; no postage is necessary. We are working to serve our patient population better, improve customer service and our patients overall experience and your input can help us to accomplish this. We thank you for your help and for the opportunity to serve you today and in the future.     Special Instructions:  Always call 9-1-1 immediately if you develop a life threatening emergency.    Unless told otherwise please take all medications as directed and complete prescription therapies.     Watch for the following signs that require additional evaluation: progressive lethargy or unresponsiveness, localized pain (chest, abdomen), shortness of breath, painful breathing, progressive vomiting with weakness, bloody stools, or new rash.     If you are prescribed pain medication or any other medication that is sedating, do not take medication before or while operating a vehicle or heavy machinery or equipment due to potential side effects such as drowsiness and/or dizziness.

## 2017-05-19 NOTE — PROGRESS NOTES
Subjective:     Sarah Rosas is a 72 y.o. female here today for follow up of chronic pain.        Chronic respiratory failure with hypoxia (CMS-[facility])  Patient is on continuous 2 L O2.         Chronically on opiate therapy  Patient has been taking oxycodone for several years, at one point she was followed by pain management. Per her report she was discharged from pain management due to an abnormal drug screen which included alcohol and amphetamines? This is something she denies. Denies any current alcohol or drug use.         COPD (chronic obstructive pulmonary disease) (CMS-[facility])  Per patient she is followed by Eduardo Trent, once yearly. This is different information that what she has told me at a prior visit. Will attempt to obtain records from their office.     Exacerbation of chronic back pain  5/10/17, Fall, Exacerbation of low back pain     History of fall  This is a new problem. Patient reports that on 5/10 she as opening her back door, tripped and put her foot into a flower pot. Denies any LOC, head injury or syncope. Reports it has made her back hurt even more.     Left hip pain  Per her report, in 2015, she broke her hip. She did end up having a hip replacement. She was seeing Esteban Oviedo. She does follow up with them periodically. No recent imaging on file.       Low back pain  She reports she has seen specialists, no surgery. She has had several MRIs. Reports that she has history of a cyst on her spine. We do not appear to have any of this information her chart. No recent imaging on file.         Pain management contract agreement  Patient has appt to [facility] care with Dr. Wilkins Aug 2017.          Consequences of Chronic Opiate therapy:    Analgesia:  not changed  Activity:  worsened  Adverse Events:  None   Aberrant Behaviors:  None   Affect/Mood: good grooming, normal speech pattern and content, normal thought patterns, good insight  Last Urine Drug Screen:   5/11/17, consistent  results   Appropriate Imaging done:   None on file, ordered today    Experiencing any side effects from current pain relievers? None     Current medicines (including changes today)  Current Outpatient Prescriptions   Medication Sig Dispense Refill   • olopatadine (PATANOL) 0.1 % ophthalmic solution PLACE 1 DROP IN EACH EYE TWO TIMES A DAY 1 Bottle 2   • ipratropium (ATROVENT) 0.03 % Solution Spray 2 Sprays in nose every 12 hours. 1 Bottle 1   • oxycodone-acetaminophen (PERCOCET-10)  MG Tab Take 1 Tab by mouth 2 times a day as needed for Severe Pain (2-3 times daily). 75 Tab 0   • trazodone (DESYREL) 100 MG Tab Take 2 Tabs by mouth at bedtime as needed for Sleep. AS NEEDED FOR SLEEP 60 Tab 2   • fluticasone-salmeterol (ADVAIR DISKUS) 100-50 MCG/DOSE AEROSOL POWDER, BREATH ACTIVATED Inhale 1 Puff by mouth every 12 hours. 1 Inhaler 2   • albuterol (PROAIR HFA) 108 (90 BASE) MCG/ACT Aero Soln inhalation aerosol INHALE TWO PUFFS BY MOUTH EVERY 6 HOURS AS NEEDED FOR SHORTNESS OF BREATH 1 Inhaler 1   • Cholecalciferol (VITAMIN D3) 2000 UNIT Cap Take 1 Cap by mouth every day. 30 Cap 3   • albuterol (PROVENTIL) 2.5mg/3ml Nebu Soln solution for nebulization 2.5 mg by Nebulization route every four hours as needed for Shortness of Breath.     • formoterol fumarate (PERFOROMIST) 20 MCG/2ML Nebu Soln Inhale 20 mcg by mouth.     • lisinopril (PRINIVIL) 10 MG Tab TAKE ONE TABLET BY MOUTH ONCE A DAY 90 Tab 1   • conjugated estrogen (PREMARIN) 0.625 MG Tab TAKE ONE TABLET BY MOUTH DAILY 90 Tab 1   • baclofen (LIORESAL) 10 MG Tab Take 1 Tab by mouth 3 times a day. 90 Tab 0   • ferrous sulfate 325 (65 FE) MG tablet Take 1 Tab by mouth every day. 30 Tab 2   • aspirin (ASA) 325 MG Tab Take 325 mg by mouth every 6 hours as needed.     • buPROPion SR (WELLBUTRIN-SR) 150 MG TABLET SR 12 HR sustained-release tablet TAKE ONE TABLET BY MOUTH TWICE A DAY 60 Tab 0   • gabapentin (NEURONTIN) 600 MG tablet Take 600 mg by mouth 2 times a day.  "    • clopidogrel (PLAVIX) 75 MG Tab Take 1 Tab by mouth every day. 30 Tab 6   • ondansetron (ZOFRAN ODT) 4 MG TABLET DISPERSIBLE Take 1 Tab by mouth every 8 hours as needed for Nausea/Vomiting. 10 Tab 0   • predniSONE (DELTASONE) 20 MG TABS Take 40 mg by mouth every day.     • Multiple Vitamins-Minerals (WOMENS MULTI VITAMIN & MINERAL) TABS Take  by mouth every day.     • pravastatin (PRAVACHOL) 20 MG TABS Take 1 Tab by mouth every bedtime. 90 Tab 3   • tiotropium (SPIRIVA) 18 MCG CAPS Inhale 18 mcg by mouth every day.     • NON SPECIFIED O2 sat at 84% on room air in office, needs O2 24/7 and would like portable concentrator 1 Each 0     No current facility-administered medications for this visit.       She  has a past medical history of Back pain; COPD (chronic obstructive pulmonary disease) (CMS-HCC); Bipolar affect, depressed (CMS-HCC) (6/23/2012); and Anxiety and depression (4/2/2015).    She  has past surgical history that includes hysterectomy, total abdominal; endometrial ablation; appendectomy; and hip nailing intramedullary (Left).    Social History     Social History   • Marital Status: Legally      Spouse Name: N/A   • Number of Children: N/A   • Years of Education: N/A     Social History Main Topics   • Smoking status: Former Smoker -- 25 years     Types: Cigarettes   • Smokeless tobacco: Never Used      Comment: Quit March 2015   • Alcohol Use: No   • Drug Use: No   • Sexual Activity: Not Asked     Other Topics Concern   • None     Social History Narrative       Family History   Problem Relation Age of Onset   • Heart Disease Father 61   • Other Sister    • Heart Disease Sister      Pacemaker         ROS  Positive for chronic pain  No fever, chest pain, no increased shortness of breath, no abdominal pain, no rashes    All other systems reviewed and are negative.        Objective:     Blood pressure 126/62, pulse 87, temperature 36.9 °C (98.5 °F), height 1.575 m (5' 2.01\"), weight 54.432 kg " (120 lb), SpO2 96 %. Body mass index is 21.94 kg/(m^2).    Physical Exam:   Constitutional: Alert, no distress. Using cane, continuous O2.   Eye: Equal, round and reactive, conjunctiva clear.   ENMT: Lips without lesions, oropharynx clear.  Neck: Trachea midline, no masses, no thyromegaly.   Respiratory: Unlabored respiratory effort, lungs clear to auscultation, no wheezes, no ronchi. Diminished bases.   Cardiovascular: Normal S1, S2, no murmur, no edema.   Skin: Warm, dry, good turgor, no rashes in visible areas.  Neuro: normal sensation in extremities.   Psych: Alert and oriented x3, normal affect and mood.        Assessment and Plan:   The following treatment plan was discussed    Dx:   Pain Management Agreement   Plan: Daily narcotic medicine to improve pain, improve activity and function with minimized side effects, to bridge patient until she can be seen and evaluated by pain management.   Medicines will not be refilled early.   Next OV due: 3 months    1. Pain management contract agreement  Patient aware to keep upcoming appointment with Pain Management, Aug 2017.     2. Chronic bilateral low back pain without sciatica  Patient to complete new drug screen today. At this time we'll bridge patient with Percocet 10-3 25 mg twice daily as needed for severe pain. I provided 75 tablets for some flexibility increased pain due to her recent exacerbation of chronic pain. Discussed again at length with patient her need to establish care with pain management. I have also advised her to obtain updated imaging MRI of lumbar spine, hip x-rays, prior to her appointment with pain management. Patient is well aware that after her appointment with pain management we will know longer prescribe chronic narcotics. Patient is amenable to this plan.  - MR-LUMBAR SPINE-W/O; Future    3. Exacerbation of chronic back pain  Appears stable.   - Patient identified as fall risk.  Appropriate orders and counseling given.    4. History of  fall  Discussed fall risks, denies any rugs or hazards. Discssued s/s to seek emergent care.   - Patient identified as fall risk.  Appropriate orders and counseling given.    5. Chronically on opiate therapy  - [facility] PAIN MANAGEMENT SCREEN; Future    6. Chronic obstructive pulmonary disease, unspecified COPD type (CMS-[facility])  Will attempt to obtain records, most recent PFT.     7. Chronic respiratory failure with hypoxia (CMS-HCC)  Stable, continue O2.     8. Left hip pain  Advised to obtain updated imaging.   - DX-HIP-BILATERAL-WITH PELVIS-2 VIEWS; Future        Discussed at length with patient use of non-pharmacological treatments as adjuncts for current pain medicine. Advised prescription is a controlled substance which is potentially habit-forming. Its use is regulated by the [place]. It must be submitted to the pharmacy within 5 days of the date written and can not be called in or faxed to the pharmacy. Any refill requires a new prescription that must be obtained from this office during regular office hours. This medicine can cause nausea, significant constipation, sedation, confusion. I have advised patient to keep medication in a safe place and to not drive with medication.    I reviewed risks, side effects, and interactions of medications, including over-the-counter medications. I recommend avoid use of benzodiazepines due to risk of interaction with opioid analgesics. I advise the patient to avoid alcohol and marijuana use or any illicits with prescribed medication. I reviewed the controlled substance prescribing program, including the risks of operating any vehicle or machinery with use of the prescribed medications, and adverse affects such as dependence, tolerance, addiction, and withdrawal with use of opioid analgesics and/or benzodiazepines.     I have reviewed the medical records, the [place] Prescription Monitoring Program and I have determined that controlled substance treatment is medically indicated to  bridge patient to chronic pain management.     Reviewed risks and benefits of treatment plan. Patient verbally agrees to plan of care.       Followup: Return in about 3 months (around 8/19/2017) for after she sees Dr. Wilkins .    PLEASE NOTE: This dictation was created using voice recognition software. I have made every reasonable attempt to correct obvious errors, but I expect that there may be errors of grammar and possibly content that I did not discover prior finalizing this note.

## 2017-05-19 NOTE — ASSESSMENT & PLAN NOTE
She reports she has seen specialists, no surgery. She has had several MRIs. Reports that she has history of a cyst on her spine. We do not appear to have any of this information her chart. No recent imaging on file.

## 2017-05-19 NOTE — ASSESSMENT & PLAN NOTE
Patient has been taking oxycodone for several years, at one point she was followed by pain management. Per her report she was discharged from pain management due to an abnormal drug screen which included alcohol and amphetamines? This is something she denies. Denies any current alcohol or drug use.

## 2017-05-19 NOTE — Clinical Note
[facility]  MARICEL Casiano  [address]  [facility] [address]  Fax: [phone]   Authorization for Release/Disclosure of   Protected Health Information   Name: AJAY LAZARO : 1945 SSN: [id number]   Address: [address] [place] [address] [place] [address] Phone:    [phone] (home)    I authorize the entity listed below to release/disclose the PHI below to:   [facility]/MARICEL Casiano and MARICEL Casiano   Provider or Entity Name:  Dr. Harris    [place], [place]  Phone:      Fax:     Reason for request: continuity of care   Information to be released:    [  ] LAST COLONOSCOPY,  including any PATH REPORT and follow-up  [  ] LAST FIT/COLOGUARD RESULT [  ] LAST DEXA  [  ] LAST MAMMOGRAM  [  ] LAST PAP  [  ] LAST LABS [  ] RETINA EXAM REPORT  [  ] IMMUNIZATION RECORDS  [  ] Release all info  (X) PFT       [  ] Check here and initial the line next to each item to release ALL health information INCLUDING  _____ Care and treatment for drug and / or alcohol abuse  _____ HIV testing, infection status, or AIDS  _____ Genetic Testing    DATES OF SERVICE OR TIME PERIOD TO BE DISCLOSED: _____________  I understand and acknowledge that:  * This Authorization may be revoked at any time by you in writing, except if your health information has already been used or disclosed.  * Your health information that will be used or disclosed as a result of you signing this authorization could be re-disclosed by the recipient. If this occurs, your re-disclosed health information may no longer be protected by State or Federal laws.  * You may refuse to sign this Authorization. Your refusal will not affect your ability to obtain treatment.  * This Authorization becomes effective upon signing and will  on (date) __________.      If no date is indicated, this Authorization will  one (1) year from the signature date.    Name:  Sarah Almendarez Snowball    Signature:   Date:     5/19/2017       PLEASE FAX REQUESTED RECORDS BACK TO: [phone]

## 2017-05-25 DIAGNOSIS — D50.9 IRON DEFICIENCY ANEMIA, UNSPECIFIED IRON DEFICIENCY ANEMIA TYPE: ICD-10-CM

## 2017-05-25 DIAGNOSIS — Z76.0 MEDICATION REFILL: ICD-10-CM

## 2017-05-25 RX ORDER — BUPROPION HYDROCHLORIDE 150 MG/1
TABLET, EXTENDED RELEASE ORAL
Qty: 60 TAB | Refills: 0 | Status: SHIPPED | OUTPATIENT
Start: 2017-05-25 | End: 2017-07-20 | Stop reason: SDUPTHER

## 2017-05-25 RX ORDER — FERROUS SULFATE 325(65) MG
325 TABLET ORAL DAILY
Qty: 30 TAB | Refills: 2 | Status: SHIPPED | OUTPATIENT
Start: 2017-05-25 | End: 2017-06-23 | Stop reason: SDUPTHER

## 2017-06-14 ENCOUNTER — TELEPHONE (OUTPATIENT)
Dept: MEDICAL GROUP | Facility: CLINIC | Age: 72
End: 2017-06-14

## 2017-06-14 NOTE — TELEPHONE ENCOUNTER
UDS 5/19/17  Consistent for meds prescribed.   Has appt to est care with Dr. Wilkins in Aug 2017.   We have bridged meds.   JULISSA Reza

## 2017-06-23 ENCOUNTER — TELEPHONE (OUTPATIENT)
Dept: MEDICAL GROUP | Facility: CLINIC | Age: 72
End: 2017-06-23

## 2017-06-23 DIAGNOSIS — J43.9 PULMONARY EMPHYSEMA, UNSPECIFIED EMPHYSEMA TYPE (HCC): ICD-10-CM

## 2017-06-23 DIAGNOSIS — D50.9 IRON DEFICIENCY ANEMIA, UNSPECIFIED IRON DEFICIENCY ANEMIA TYPE: ICD-10-CM

## 2017-06-23 RX ORDER — FERROUS SULFATE 325(65) MG
325 TABLET ORAL DAILY
Qty: 30 TAB | Refills: 2 | Status: SHIPPED | OUTPATIENT
Start: 2017-06-23 | End: 2018-02-05 | Stop reason: SDUPTHER

## 2017-06-23 NOTE — TELEPHONE ENCOUNTER
1. Caller Name: Sarah                      Call Back Number: [phone] (home)     2. Message: Patient called in for imaging results. They have not been sent over.  Requested from [facility] and will notify patient of results when they come in.    3. Patient approves office to leave a detailed voicemail/MyChart message: N\A

## 2017-07-13 ENCOUNTER — APPOINTMENT (OUTPATIENT)
Dept: BEHAVIORAL HEALTH | Facility: PHYSICIAN GROUP | Age: 72
End: 2017-07-13
Payer: MEDICARE

## 2017-07-20 DIAGNOSIS — Z76.0 MEDICATION REFILL: ICD-10-CM

## 2017-07-20 DIAGNOSIS — J43.0 UNILATERAL EMPHYSEMA (HCC): ICD-10-CM

## 2017-07-20 RX ORDER — IPRATROPIUM BROMIDE 21 UG/1
2 SPRAY, METERED NASAL EVERY 12 HOURS
Qty: 1 BOTTLE | Refills: 2 | Status: SHIPPED | OUTPATIENT
Start: 2017-07-20 | End: 2018-02-05 | Stop reason: SDUPTHER

## 2017-07-20 RX ORDER — BUPROPION HYDROCHLORIDE 150 MG/1
TABLET, EXTENDED RELEASE ORAL
Qty: 60 TAB | Refills: 2 | Status: SHIPPED | OUTPATIENT
Start: 2017-07-20 | End: 2017-11-07 | Stop reason: SDUPTHER

## 2017-08-17 RX ORDER — ALBUTEROL SULFATE 90 UG/1
AEROSOL, METERED RESPIRATORY (INHALATION)
Qty: 1 INHALER | Refills: 1 | Status: SHIPPED | OUTPATIENT
Start: 2017-08-17 | End: 2017-12-27 | Stop reason: SDUPTHER

## 2017-08-18 DIAGNOSIS — F31.30 BIPOLAR AFFECTIVE DISORDER, CURRENT EPISODE DEPRESSED, CURRENT EPISODE SEVERITY UNSPECIFIED (HCC): ICD-10-CM

## 2017-08-18 RX ORDER — TRAZODONE HYDROCHLORIDE 100 MG/1
200 TABLET ORAL NIGHTLY PRN
Qty: 60 TAB | Refills: 2 | Status: SHIPPED | OUTPATIENT
Start: 2017-08-18 | End: 2018-02-05 | Stop reason: SDUPTHER

## 2017-08-21 ENCOUNTER — OFFICE VISIT (OUTPATIENT)
Dept: MEDICAL GROUP | Facility: CLINIC | Age: 72
End: 2017-08-21
Payer: MEDICARE

## 2017-08-21 VITALS
SYSTOLIC BLOOD PRESSURE: 100 MMHG | TEMPERATURE: 98.5 F | OXYGEN SATURATION: 95 % | HEIGHT: 61 IN | DIASTOLIC BLOOD PRESSURE: 60 MMHG | RESPIRATION RATE: 16 BRPM | BODY MASS INDEX: 23.22 KG/M2 | WEIGHT: 123 LBS | HEART RATE: 83 BPM

## 2017-08-21 DIAGNOSIS — Z79.891 CHRONICALLY ON OPIATE THERAPY: ICD-10-CM

## 2017-08-21 DIAGNOSIS — J43.9 PULMONARY EMPHYSEMA, UNSPECIFIED EMPHYSEMA TYPE (HCC): ICD-10-CM

## 2017-08-21 DIAGNOSIS — I10 ESSENTIAL HYPERTENSION: ICD-10-CM

## 2017-08-21 DIAGNOSIS — J96.11 CHRONIC RESPIRATORY FAILURE WITH HYPOXIA (HCC): ICD-10-CM

## 2017-08-21 DIAGNOSIS — J44.9 CHRONIC OBSTRUCTIVE PULMONARY DISEASE, UNSPECIFIED COPD TYPE (HCC): ICD-10-CM

## 2017-08-21 DIAGNOSIS — F41.9 ANXIETY: ICD-10-CM

## 2017-08-21 DIAGNOSIS — G89.29 OTHER CHRONIC PAIN: ICD-10-CM

## 2017-08-21 DIAGNOSIS — Z00.00 WELL ADULT EXAM: ICD-10-CM

## 2017-08-21 PROBLEM — Z02.89 PAIN MANAGEMENT CONTRACT AGREEMENT: Status: RESOLVED | Noted: 2017-04-06 | Resolved: 2017-08-21

## 2017-08-21 PROBLEM — Z91.81 HISTORY OF FALL: Status: RESOLVED | Noted: 2017-05-19 | Resolved: 2017-08-21

## 2017-08-21 PROBLEM — M25.552 LEFT HIP PAIN: Status: RESOLVED | Noted: 2017-04-06 | Resolved: 2017-08-21

## 2017-08-21 PROBLEM — M54.9 EXACERBATION OF CHRONIC BACK PAIN: Status: RESOLVED | Noted: 2017-05-19 | Resolved: 2017-08-21

## 2017-08-21 PROBLEM — D50.9 IRON DEFICIENCY ANEMIA: Status: RESOLVED | Noted: 2017-01-25 | Resolved: 2017-08-21

## 2017-08-21 PROCEDURE — 99214 OFFICE O/P EST MOD 30 MIN: CPT | Performed by: FAMILY MEDICINE

## 2017-08-21 RX ORDER — ESCITALOPRAM OXALATE 20 MG/1
20 TABLET ORAL DAILY
COMMUNITY
End: 2017-08-21 | Stop reason: SDUPTHER

## 2017-08-21 RX ORDER — OXYCODONE AND ACETAMINOPHEN 10; 325 MG/1; MG/1
1 TABLET ORAL 2 TIMES DAILY PRN
Qty: 60 TAB | Refills: 0 | Status: SHIPPED | OUTPATIENT
Start: 2017-08-21 | End: 2018-01-30

## 2017-08-21 ASSESSMENT — ENCOUNTER SYMPTOMS
HEMOPTYSIS: 0
PALPITATIONS: 0
FEVER: 0
COUGH: 0
EYES NEGATIVE: 1
CARDIOVASCULAR NEGATIVE: 1
RESPIRATORY NEGATIVE: 1
CONSTIPATION: 0
NEUROLOGICAL NEGATIVE: 1
HEADACHES: 0
GASTROINTESTINAL NEGATIVE: 1
NECK PAIN: 0
MYALGIAS: 0
BACK PAIN: 1
CHILLS: 0
PSYCHIATRIC NEGATIVE: 1
CONSTITUTIONAL NEGATIVE: 1
DIZZINESS: 0

## 2017-08-21 NOTE — PROGRESS NOTES
Subjective:      Sarah Rosas is a 72 y.o. female who presents with Medication Refill and Other            HPI Comments: 1. Chronic obstructive pulmonary disease, unspecified COPD type (CMS-HCC)  Patient has a diagnosis of copd and is using their medications and trying to avoid triggers such as colds/smoke/allergens.controlled        2. Chronically on opiate therapy  Gave a sample in may one month late for a request in April, will have her do this again today and no rx until she gives a sample  - oxycodone-acetaminophen (PERCOCET-10)  MG Tab; Take 1 Tab by mouth 2 times a day as needed for Severe Pain (2-3 times daily).  Dispense: 60 Tab; Refill: 0    3. Other chronic pain    - oxycodone-acetaminophen (PERCOCET-10)  MG Tab; Take 1 Tab by mouth 2 times a day as needed for Severe Pain (2-3 times daily).  Dispense: 60 Tab; Refill: 0    4. Chronic respiratory failure with hypoxia (CMS-HCC)  Stable on O2    5. Essential hypertension  Currently treated for HTN, taking meds with no CP or sob, monitors bp at home periodically. controlled        6. Anxiety  controlled      7. Well adult exam    - OCCULT BLOOD FECES IMMUNOASSAY; Future    Past Medical History:    Back pain                                                     COPD (chronic obstructive pulmonary disease) (*                 Comment:Dr. Harris    Bipolar affect, depressed (CMS-HCC)             6/23/2012     Anxiety and depression                          4/2/2015    Past Surgical History:    HYSTERECTOMY, TOTAL ABDOMINAL                                  ENDOMETRIAL ABLATION                                           APPENDECTOMY                                                   HIP NAILING INTRAMEDULLARY                      Left               Smoking Status: Former Smoker                   Packs/Day: 0.00  Years: 25         Types: Cigarettes    Smokeless Status: Never Used                        Comment: Quit March 2015    Alcohol Use: No               Review of patient's family history indicates:    Heart Disease                  Father                    Other                          Sister                    Heart Disease                  Sister                      Comment: Pacemaker      Current outpatient prescriptions: •  escitalopram (LEXAPRO) 20 MG tablet, Take 20 mg by mouth every day., Disp: , Rfl: •  oxycodone-acetaminophen (PERCOCET-10)  MG Tab, Take 1 Tab by mouth 2 times a day as needed for Severe Pain (2-3 times daily)., Disp: 60 Tab, Rfl: 0•  trazodone (DESYREL) 100 MG Tab, Take 2 Tabs by mouth at bedtime as needed for Sleep. AS NEEDED FOR SLEEP, Disp: 60 Tab, Rfl: 2•  albuterol (PROAIR HFA) 108 (90 Base) MCG/ACT Aero Soln inhalation aerosol, INHALE TWO PUFFS BY MOUTH EVERY 6 HOURS AS NEEDED FOR SHORTNESS OF BREATH, Disp: 1 Inhaler, Rfl: 1•  ipratropium (ATROVENT) 0.03 % Solution, Spray 2 Sprays in nose every 12 hours., Disp: 1 Bottle, Rfl: 2•  fluticasone-salmeterol (ADVAIR DISKUS) 100-50 MCG/DOSE AEROSOL POWDER, BREATH ACTIVATED, Inhale 1 Puff by mouth every 12 hours., Disp: 1 Inhaler, Rfl: 2•  buPROPion SR (WELLBUTRIN-SR) 150 MG TABLET SR 12 HR sustained-release tablet, TAKE ONE TABLET BY MOUTH TWICE A DAY, Disp: 60 Tab, Rfl: 2•  ferrous sulfate 325 (65 FE) MG tablet, Take 1 Tab by mouth every day., Disp: 30 Tab, Rfl: 2•  olopatadine (PATANOL) 0.1 % ophthalmic solution, PLACE 1 DROP IN EACH EYE TWO TIMES A DAY, Disp: 1 Bottle, Rfl: 2•  Cholecalciferol (VITAMIN D3) 2000 UNIT Cap, Take 1 Cap by mouth every day., Disp: 30 Cap, Rfl: 3•  albuterol (PROVENTIL) 2.5mg/3ml Nebu Soln solution for nebulization, 2.5 mg by Nebulization route every four hours as needed for Shortness of Breath., Disp: , Rfl: •  formoterol fumarate (PERFOROMIST) 20 MCG/2ML Nebu Soln, Inhale 20 mcg by mouth., Disp: , Rfl: •  lisinopril (PRINIVIL) 10 MG Tab, TAKE ONE TABLET BY MOUTH ONCE A DAY, Disp: 90 Tab, Rfl: 1•  baclofen (LIORESAL) 10 MG Tab, Take 1 Tab by  mouth 3 times a day., Disp: 90 Tab, Rfl: 0•  gabapentin (NEURONTIN) 600 MG tablet, Take 600 mg by mouth 2 times a day., Disp: , Rfl: •  clopidogrel (PLAVIX) 75 MG Tab, Take 1 Tab by mouth every day., Disp: 30 Tab, Rfl: 6•  NON SPECIFIED, O2 sat at 84% on room air in office, needs O2 24/7 and would like portable concentrator, Disp: 1 Each, Rfl: 0•  Multiple Vitamins-Minerals (WOMENS MULTI VITAMIN & MINERAL) TABS, Take  by mouth every day., Disp: , Rfl: •  conjugated estrogen (PREMARIN) 0.625 MG Tab, TAKE ONE TABLET BY MOUTH DAILY (Patient not taking: Reported on 8/21/2017), Disp: 90 Tab, Rfl: 0•  aspirin (ASA) 325 MG Tab, Take 325 mg by mouth every 6 hours as needed., Disp: , Rfl: •  ondansetron (ZOFRAN ODT) 4 MG TABLET DISPERSIBLE, Take 1 Tab by mouth every 8 hours as needed for Nausea/Vomiting. (Patient not taking: Reported on 8/21/2017), Disp: 10 Tab, Rfl: 0•  predniSONE (DELTASONE) 20 MG TABS, Take 40 mg by mouth every day., Disp: , Rfl: •  pravastatin (PRAVACHOL) 20 MG TABS, Take 1 Tab by mouth every bedtime., Disp: 90 Tab, Rfl: 3•  tiotropium (SPIRIVA) 18 MCG CAPS, Inhale 18 mcg by mouth every day., Disp: , Rfl:         Other  Pertinent negatives include no chest pain, chills, coughing, fever, headaches, myalgias, neck pain or rash.       Review of Systems   Constitutional: Negative.  Negative for fever and chills.        Past Medical History:    Back pain                                                     COPD (chronic obstructive pulmonary disease) (*                 Comment:Dr. Harris    Bipolar affect, depressed (CMS-[facility])             6/23/2012     Anxiety and depression                          4/2/2015    Past Surgical History:    HYSTERECTOMY, TOTAL ABDOMINAL                                  ENDOMETRIAL ABLATION                                           APPENDECTOMY                                                   HIP NAILING INTRAMEDULLARY                      Left               Smoking Status:  "Former Smoker                   Packs/Day: 0.00  Years: 25        Types: Cigarettes    Smokeless Status: Never Used                        Comment: Quit March 2015    Alcohol Use: No              Review of patient's family history indicates:    Heart Disease                  Father                    Other                          Sister                    Heart Disease                  Sister                      Comment: Pacemaker     HENT: Negative.    Eyes: Negative.    Respiratory: Negative.  Negative for cough and hemoptysis.    Cardiovascular: Negative.  Negative for chest pain and palpitations.   Gastrointestinal: Negative.  Negative for constipation.   Genitourinary: Negative.  Negative for dysuria and urgency.   Musculoskeletal: Positive for back pain and joint pain. Negative for myalgias and neck pain.   Skin: Negative.  Negative for rash.   Neurological: Negative.  Negative for dizziness and headaches.   Endo/Heme/Allergies: Negative.    Psychiatric/Behavioral: Negative.  Negative for suicidal ideas.          Objective:     /60 mmHg  Pulse 83  Temp(Src) 36.9 °C (98.5 °F)  Resp 16  Ht 1.549 m (5' 0.98\")  Wt 55.792 kg (123 lb)  BMI 23.25 kg/m2  SpO2 95%     Physical Exam   Constitutional: She is oriented to person, place, and time. No distress.   HENT:   Head: Normocephalic and atraumatic.   Right Ear: External ear normal.   Left Ear: External ear normal.   Nose: Nose normal.   Mouth/Throat: Oropharynx is clear and moist. No oropharyngeal exudate.   Eyes: Pupils are equal, round, and reactive to light. Right eye exhibits no discharge. Left eye exhibits no discharge. No scleral icterus.   Neck: Normal range of motion. Neck supple. No JVD present. No tracheal deviation present. No thyromegaly present.   Cardiovascular: Normal rate, regular rhythm, normal heart sounds and intact distal pulses.  Exam reveals no gallop and no friction rub.    No murmur heard.  Pulmonary/Chest: Effort normal and " breath sounds normal. No stridor. No respiratory distress. She has no wheezes. She has no rales. She exhibits no tenderness.   Abdominal: Soft. She exhibits no distension. There is no tenderness.   Musculoskeletal:   Ambulates with cane, stable on o2   Lymphadenopathy:     She has no cervical adenopathy.   Neurological: She is alert and oriented to person, place, and time.   Skin: Skin is warm and dry. She is not diaphoretic.   Psychiatric: Judgment normal.   Nursing note and vitals reviewed.              Assessment/Plan:     1. Chronic obstructive pulmonary disease, unspecified COPD type (CMS-[facility])      2. Chronically on opiate therapy    - oxycodone-acetaminophen (PERCOCET-10)  MG Tab; Take 1 Tab by mouth 2 times a day as needed for Severe Pain (2-3 times daily).  Dispense: 60 Tab; Refill: 0    3. Other chronic pain    - oxycodone-acetaminophen (PERCOCET-10)  MG Tab; Take 1 Tab by mouth 2 times a day as needed for Severe Pain (2-3 times daily).  Dispense: 60 Tab; Refill: 0    4. Chronic respiratory failure with hypoxia (CMS-[facility])      5. Essential hypertension      6. Anxiety      7. Well adult exam    - OCCULT BLOOD FECES IMMUNOASSAY; Future

## 2017-08-21 NOTE — MR AVS SNAPSHOT
"        Sarah Almendarez Snowball   2017 10:15 AM   Office Visit   MRN: [id number]    Department:  [facility]   Dept Phone:  [phone]    Description:  Female : 1945   Provider:  Ted Jerome M.D.           Reason for Visit     Medication Refill percocet    Other sore on right arm x1.5 weeks       Allergies as of 2017     Allergen Noted Reactions    Bee 2010         You were diagnosed with     Chronic obstructive pulmonary disease, unspecified COPD type (CMS-[facility])   [4705566]       Chronically on opiate therapy   [5448529]       Other chronic pain   [338.29.ICD-9-CM]       Chronic respiratory failure with hypoxia (CMS-[facility])   [025772]       Essential hypertension   [0938809]       Anxiety   [614459]       Well adult exam   [873686]         Vital Signs     Blood Pressure Pulse Temperature Respirations Height Weight    100/60 mmHg 83 36.9 °C (98.5 °F) 16 1.549 m (5' 0.98\") 55.792 kg (123 lb)    Body Mass Index Oxygen Saturation Smoking Status             23.25 kg/m2 95% Former Smoker         Basic Information     Date Of Birth Sex Race Ethnicity Preferred Language    1945 Female White Non- English      Your appointments     Sep 18, 2017 11:15 AM   Established Patient with Ted Jerome M.D.   [facility] (--)    [address] [place] [id number] [phone]           You will be receiving a confirmation call a few days before your appointment from our automated call confirmation system.              Problem List              ICD-10-CM Priority Class Noted - Resolved    COPD (chronic obstructive pulmonary disease) (CMS-[facility]) J44.9   Unknown - Present    Essential hypertension I10   2013 - Present    Anxiety F41.9   2015 - Present    Chronic respiratory failure with hypoxia (CMS-[facility]) J96.11   2016 - Present    Chronic pain G89.29   2017 - Present    Chronically on opiate therapy Z79.891   2017 - Present      "   Health Maintenance        Date Due Completion Dates    MAMMOGRAM 7/16/2016 7/16/2015, 1/16/2014    IMM INFLUENZA (1) 9/1/2017 ---    IMM PNEUMOCOCCAL 65+ (ADULT) LOW/MEDIUM RISK SERIES (2 of 2 - PPSV23) 4/6/2018 4/6/2017    BONE DENSITY 1/24/2019 1/24/2014 (Prv Comp)    Override on 1/24/2014: Previously completed    COLONOSCOPY 1/14/2024 1/14/2014 (Declined), 8/13/2002    Override on 1/14/2014: Patient Declined    IMM DTaP/Tdap/Td Vaccine (2 - Td) 4/6/2027 4/6/2017            Current Immunizations     13-VALENT PCV PREVNAR 4/6/2017    SHINGLES VACCINE 12/1/2014    Tdap Vaccine 4/6/2017      Below and/or attached are the medications your provider expects you to take. Review all of your home medications and newly ordered medications with your provider and/or pharmacist. Follow medication instructions as directed by your provider and/or pharmacist. Please keep your medication list with you and share with your provider. Update the information when medications are discontinued, doses are changed, or new medications (including over-the-counter products) are added; and carry medication information at all times in the event of emergency situations     Allergies:  BEE - (reactions not documented)               Medications  Valid as of: August 21, 2017 - 11:25 AM    Generic Name Brand Name Tablet Size Instructions for use    Albuterol Sulfate (Nebu Soln) PROVENTIL 2.5mg/3ml 2.5 mg by Nebulization route every four hours as needed for Shortness of Breath.        Albuterol Sulfate (Aero Soln) albuterol 108 (90 Base) MCG/ACT INHALE TWO PUFFS BY MOUTH EVERY 6 HOURS AS NEEDED FOR SHORTNESS OF BREATH        Aspirin (Tab)  MG Take 325 mg by mouth every 6 hours as needed.        Baclofen (Tab) LIORESAL 10 MG Take 1 Tab by mouth 3 times a day.        BuPROPion HCl (TABLET SR 12 HR) WELLBUTRIN- MG TAKE ONE TABLET BY MOUTH TWICE A DAY        Cholecalciferol (Cap) Vitamin D3 2000 UNIT Take 1 Cap by mouth every day.         Clopidogrel Bisulfate (Tab) PLAVIX 75 MG Take 1 Tab by mouth every day.        Escitalopram Oxalate (Tab) LEXAPRO 20 MG Take 20 mg by mouth every day.        Estrogens Conjugated (Tab) PREMARIN 0.625 MG TAKE ONE TABLET BY MOUTH DAILY        Ferrous Sulfate (Tab) ferrous sulfate 325 (65 Fe) MG Take 1 Tab by mouth every day.        Fluticasone-Salmeterol (AEROSOL POWDER, BREATH ACTIVATED) ADVAIR 100-50 MCG/DOSE Inhale 1 Puff by mouth every 12 hours.        Formoterol Fumarate (Nebu Soln) PERFOROMIST 20 MCG/2ML Inhale 20 mcg by mouth.        Gabapentin (Tab) NEURONTIN 600 MG Take 600 mg by mouth 2 times a day.        Ipratropium Bromide (Solution) ATROVENT 0.03 % Spray 2 Sprays in nose every 12 hours.        Lisinopril (Tab) PRINIVIL 10 MG TAKE ONE TABLET BY MOUTH ONCE A DAY        Multiple Vitamins-Minerals (Tab) WOMENS MULTI VITAMIN & MINERAL  Take  by mouth every day.        NON SPECIFIED   O2 sat at 84% on room air in office, needs O2 24/7 and would like portable concentrator        Olopatadine HCl (Solution) PATANOL 0.1 % PLACE 1 DROP IN EACH EYE TWO TIMES A DAY        Ondansetron (TABLET DISPERSIBLE) ZOFRAN ODT 4 MG Take 1 Tab by mouth every 8 hours as needed for Nausea/Vomiting.        Oxycodone-Acetaminophen (Tab) PERCOCET-10  MG Take 1 Tab by mouth 2 times a day as needed for Severe Pain (2-3 times daily).        Pravastatin Sodium (Tab) PRAVACHOL 20 MG Take 1 Tab by mouth every bedtime.        PredniSONE (Tab) DELTASONE 20 MG Take 40 mg by mouth every day.        Tiotropium Bromide Monohydrate (Cap) SPIRIVA 18 MCG Inhale 18 mcg by mouth every day.        TraZODone HCl (Tab) DESYREL 100 MG Take 2 Tabs by mouth at bedtime as needed for Sleep. AS NEEDED FOR SLEEP        .                 Medicines prescribed today were sent to:     [facility] - [place], [place] - [address] [facility]    [address] [facility] [address] [place] [address]    Phone: [phone] Fax: [phone]    Open 24 Hours?: No         Medication refill instructions:       If your prescription bottle indicates you have medication refills left, it is not necessary to call your provider’s office. Please contact your pharmacy and they will refill your medication.    If your prescription bottle indicates you do not have any refills left, you may request refills at any time through one of the following ways: The online "[employer] system (except Urgent Care), by calling your provider’s office, or by asking your pharmacy to contact your provider’s office with a refill request. Medication refills are processed only during regular business hours and may not be available until the next business day. Your provider may request additional information or to have a follow-up visit with you prior to refilling your medication.   *Please Note: Medication refills are assigned a new Rx number when refilled electronically. Your pharmacy may indicate that no refills were authorized even though a new prescription for the same medication is available at the pharmacy. Please request the medicine by name with the pharmacy before contacting your provider for a refill.        Your To Do List     Future Labs/Procedures Complete By Expires    OCCULT BLOOD FECES IMMUNOASSAY  As directed 8/21/2018         "[employer] Status: Patient Declined

## 2017-08-22 RX ORDER — ESCITALOPRAM OXALATE 20 MG/1
20 TABLET ORAL DAILY
Qty: 30 TAB | Refills: 2 | Status: SHIPPED | OUTPATIENT
Start: 2017-08-22 | End: 2017-11-21 | Stop reason: SDUPTHER

## 2017-08-22 RX ORDER — ACETAMINOPHEN 160 MG
2000 TABLET,DISINTEGRATING ORAL DAILY
Qty: 30 CAP | Refills: 5 | Status: SHIPPED | OUTPATIENT
Start: 2017-08-22 | End: 2018-03-12 | Stop reason: SDUPTHER

## 2017-08-22 RX ORDER — LISINOPRIL 10 MG/1
TABLET ORAL
Qty: 90 TAB | Refills: 1 | Status: SHIPPED | OUTPATIENT
Start: 2017-08-22 | End: 2018-02-06 | Stop reason: SDUPTHER

## 2017-08-31 RX ORDER — BACLOFEN 10 MG/1
10 TABLET ORAL 3 TIMES DAILY
Qty: 90 TAB | Refills: 2 | Status: SHIPPED | OUTPATIENT
Start: 2017-08-31 | End: 2017-11-21 | Stop reason: SDUPTHER

## 2017-09-14 DIAGNOSIS — Z79.891 CHRONICALLY ON OPIATE THERAPY: ICD-10-CM

## 2017-09-14 DIAGNOSIS — G89.29 OTHER CHRONIC PAIN: ICD-10-CM

## 2017-09-14 DIAGNOSIS — R89.2 ABNORMAL DRUG SCREEN: ICD-10-CM

## 2017-09-14 RX ORDER — OXYCODONE AND ACETAMINOPHEN 10; 325 MG/1; MG/1
1 TABLET ORAL 2 TIMES DAILY PRN
Qty: 60 TAB | Refills: 0 | OUTPATIENT
Start: 2017-09-14

## 2017-09-14 NOTE — TELEPHONE ENCOUNTER
Was the patient seen in the last year in this department? No     Does patient have an active prescription for medications requested? No     Received Request Via; Patient

## 2017-09-14 NOTE — TELEPHONE ENCOUNTER
Patient had appt with Dr. Wilkins in Aug, which was canceled by patient.   She has since canceled with me and has appt with Dr. Jerome, 9/18.   JULISSA Reza

## 2017-09-18 ENCOUNTER — OFFICE VISIT (OUTPATIENT)
Dept: MEDICAL GROUP | Facility: CLINIC | Age: 72
End: 2017-09-18
Payer: MEDICARE

## 2017-09-18 VITALS
BODY MASS INDEX: 23 KG/M2 | RESPIRATION RATE: 20 BRPM | OXYGEN SATURATION: 96 % | HEIGHT: 62 IN | HEART RATE: 82 BPM | WEIGHT: 125 LBS | DIASTOLIC BLOOD PRESSURE: 70 MMHG | TEMPERATURE: 96.5 F | SYSTOLIC BLOOD PRESSURE: 132 MMHG

## 2017-09-18 DIAGNOSIS — Z79.891 CHRONICALLY ON OPIATE THERAPY: ICD-10-CM

## 2017-09-18 DIAGNOSIS — G89.29 OTHER CHRONIC PAIN: ICD-10-CM

## 2017-09-18 PROCEDURE — 99213 OFFICE O/P EST LOW 20 MIN: CPT | Performed by: FAMILY MEDICINE

## 2017-09-18 ASSESSMENT — PATIENT HEALTH QUESTIONNAIRE - PHQ9
5. POOR APPETITE OR OVEREATING: 2 - MORE THAN HALF THE DAYS
SUM OF ALL RESPONSES TO PHQ QUESTIONS 1-9: 18
CLINICAL INTERPRETATION OF PHQ2 SCORE: 6

## 2017-09-18 ASSESSMENT — PAIN SCALES - GENERAL: PAINLEVEL: 6=MODERATE PAIN

## 2017-09-18 NOTE — PROGRESS NOTES
Over 50% of this 15 minute visit was spent on counseling and coordination of care regarding the patient's current problem of   1. Chronically on opiate therapy  Patient has seen pain clinic for in the past and was discharged last year.   She was given a new referral but evidently when called she thought it was for a psychiatry and cancelled it  Instructed her that this was her pain management and that she absolutely needs to keep this appointment  In April she passed her drug test but took 5 days to come in and give us a sample  Instructed her that today we will have her do another uds and if and only if it comes back normal will we give her a temporary rx until she sees the new pain clinic  A new referral was done for her for the pain specialist    2. Other chronic pain      Past Medical History:   Diagnosis Date   • Anxiety and depression 4/2/2015   • Bipolar affect, depressed (CMS-HCC) 6/23/2012   • Back pain    • COPD (chronic obstructive pulmonary disease) (CMS-HCC)     Dr. Harris     Past Surgical History:   Procedure Laterality Date   • APPENDECTOMY     • ENDOMETRIAL ABLATION     • HIP NAILING INTRAMEDULLARY Left    • HYSTERECTOMY, TOTAL ABDOMINAL       Social History   Substance Use Topics   • Smoking status: Former Smoker     Years: 25.00     Types: Cigarettes   • Smokeless tobacco: Never Used      Comment: Quit March 2015   • Alcohol use No     Family History   Problem Relation Age of Onset   • Heart Disease Father 61   • Other Sister    • Heart Disease Sister      Pacemaker         Current Outpatient Prescriptions:   •  baclofen (LIORESAL) 10 MG Tab, Take 1 Tab by mouth 3 times a day., Disp: 90 Tab, Rfl: 2  •  Cholecalciferol (VITAMIN D3) 2000 UNIT Cap, Take 1 Cap by mouth every day., Disp: 30 Cap, Rfl: 5  •  escitalopram (LEXAPRO) 20 MG tablet, Take 1 Tab by mouth every day., Disp: 30 Tab, Rfl: 2  •  lisinopril (PRINIVIL) 10 MG Tab, TAKE ONE TABLET BY MOUTH ONCE A DAY, Disp: 90 Tab, Rfl: 1  •   oxycodone-acetaminophen (PERCOCET-10)  MG Tab, Take 1 Tab by mouth 2 times a day as needed for Severe Pain (2-3 times daily)., Disp: 60 Tab, Rfl: 0  •  trazodone (DESYREL) 100 MG Tab, Take 2 Tabs by mouth at bedtime as needed for Sleep. AS NEEDED FOR SLEEP, Disp: 60 Tab, Rfl: 2  •  albuterol (PROAIR HFA) 108 (90 Base) MCG/ACT Aero Soln inhalation aerosol, INHALE TWO PUFFS BY MOUTH EVERY 6 HOURS AS NEEDED FOR SHORTNESS OF BREATH, Disp: 1 Inhaler, Rfl: 1  •  ipratropium (ATROVENT) 0.03 % Solution, Spray 2 Sprays in nose every 12 hours., Disp: 1 Bottle, Rfl: 2  •  fluticasone-salmeterol (ADVAIR DISKUS) 100-50 MCG/DOSE AEROSOL POWDER, BREATH ACTIVATED, Inhale 1 Puff by mouth every 12 hours., Disp: 1 Inhaler, Rfl: 2  •  buPROPion SR (WELLBUTRIN-SR) 150 MG TABLET SR 12 HR sustained-release tablet, TAKE ONE TABLET BY MOUTH TWICE A DAY, Disp: 60 Tab, Rfl: 2  •  conjugated estrogen (PREMARIN) 0.625 MG Tab, TAKE ONE TABLET BY MOUTH DAILY, Disp: 90 Tab, Rfl: 0  •  ferrous sulfate 325 (65 FE) MG tablet, Take 1 Tab by mouth every day., Disp: 30 Tab, Rfl: 2  •  olopatadine (PATANOL) 0.1 % ophthalmic solution, PLACE 1 DROP IN EACH EYE TWO TIMES A DAY, Disp: 1 Bottle, Rfl: 2  •  albuterol (PROVENTIL) 2.5mg/3ml Nebu Soln solution for nebulization, 2.5 mg by Nebulization route every four hours as needed for Shortness of Breath., Disp: , Rfl:   •  formoterol fumarate (PERFOROMIST) 20 MCG/2ML Nebu Soln, Inhale 20 mcg by mouth., Disp: , Rfl:   •  gabapentin (NEURONTIN) 600 MG tablet, Take 600 mg by mouth 2 times a day., Disp: , Rfl:   •  clopidogrel (PLAVIX) 75 MG Tab, Take 1 Tab by mouth every day., Disp: 30 Tab, Rfl: 6  •  NON SPECIFIED, O2 sat at 84% on room air in office, needs O2 24/7 and would like portable concentrator, Disp: 1 Each, Rfl: 0  •  Multiple Vitamins-Minerals (WOMENS MULTI VITAMIN & MINERAL) TABS, Take  by mouth every day., Disp: , Rfl:   •  pravastatin (PRAVACHOL) 20 MG TABS, Take 1 Tab by mouth every bedtime.,  Disp: 90 Tab, Rfl: 3  •  aspirin (ASA) 325 MG Tab, Take 325 mg by mouth every 6 hours as needed., Disp: , Rfl:   •  ondansetron (ZOFRAN ODT) 4 MG TABLET DISPERSIBLE, Take 1 Tab by mouth every 8 hours as needed for Nausea/Vomiting. (Patient not taking: Reported on 8/21/2017), Disp: 10 Tab, Rfl: 0  •  predniSONE (DELTASONE) 20 MG TABS, Take 40 mg by mouth every day., Disp: , Rfl:   •  tiotropium (SPIRIVA) 18 MCG CAPS, Inhale 18 mcg by mouth every day., Disp: , Rfl:

## 2017-09-26 ENCOUNTER — TELEPHONE (OUTPATIENT)
Dept: MEDICAL GROUP | Facility: CLINIC | Age: 72
End: 2017-09-26

## 2017-09-26 NOTE — TELEPHONE ENCOUNTER
1. Caller Name: Sarah                      Call Back Number: [phone] (home)     2. Message: Patient called in for results of MRI - lumbar - no results in chart - requested from [facility].  Patient also asked for refill on percocet - no refill yet, waiting on results of millennium    3. Patient approves office to leave a detailed voicemail/MyChart message: N\A

## 2017-09-28 NOTE — TELEPHONE ENCOUNTER
Dr. Jerome told patient at last appt that he was not refilling this until her millennium results came back.  Patient was advised this on 9/26 when she called.  She was told that as soon as her results come in, she would receive a call regarding his decision to fill this medication.      ===View-only below this line===    ----- Message -----  From: Stephenie Liang, Med Ass't  Sent: 9/27/2017   2:31 PM  To: [facility]  Subject: medication refill                                Patient called regarding her prescriptions for oxy. Need a hard copy prescription.

## 2017-09-29 NOTE — TELEPHONE ENCOUNTER
Patient called in to see if her uds results were back so she can get her pain medication - I checked the [facility] site and they are under clinical review.  Patient has been informed of this and she let me know she will be calling back every day to see if results are in.

## 2017-10-05 DIAGNOSIS — G89.29 OTHER CHRONIC PAIN: ICD-10-CM

## 2017-10-05 DIAGNOSIS — Z79.891 CHRONICALLY ON OPIATE THERAPY: ICD-10-CM

## 2017-10-05 RX ORDER — OXYCODONE AND ACETAMINOPHEN 10; 325 MG/1; MG/1
1 TABLET ORAL 2 TIMES DAILY PRN
Qty: 60 TAB | Refills: 0 | Status: CANCELLED | OUTPATIENT
Start: 2017-10-05

## 2017-10-05 NOTE — TELEPHONE ENCOUNTER
Pt states she saw you on 9/18/17 and did a drug test and that the results take 2 weeks. She has not heard and is requesting a refill.    Was the patient seen in the last year in this department? Yes     Does patient have an active prescription for medications requested? yes    Received Request Via: Patient

## 2017-10-09 PROBLEM — R89.2 ABNORMAL DRUG SCREEN: Status: ACTIVE | Noted: 2017-10-09

## 2017-10-09 NOTE — TELEPHONE ENCOUNTER
LVM for pt to return call - Dr. Jerome stated that patient needs to get this medication from pain clinic because she has an abnormal UDS

## 2017-10-11 ENCOUNTER — TELEPHONE (OUTPATIENT)
Dept: MEDICAL GROUP | Facility: CLINIC | Age: 72
End: 2017-10-11

## 2017-10-11 NOTE — TELEPHONE ENCOUNTER
Patient called and inquiring about a refill for her pain medication. According to a recent refill encounter on 10-5-17, Dr. Jerome stated that this patient needs to the her pain medication from the pain clinic because she has an abnormal UDS. I informed the patient of this notation and she is going to call Dr. Wilkins.

## 2017-11-07 DIAGNOSIS — Z76.0 MEDICATION REFILL: ICD-10-CM

## 2017-11-07 DIAGNOSIS — J43.0 UNILATERAL EMPHYSEMA (HCC): ICD-10-CM

## 2017-11-07 RX ORDER — GABAPENTIN 600 MG/1
600 TABLET ORAL 2 TIMES DAILY
Qty: 90 TAB | Refills: 3 | Status: SHIPPED | OUTPATIENT
Start: 2017-11-07 | End: 2018-04-20 | Stop reason: SDUPTHER

## 2017-11-07 RX ORDER — BUPROPION HYDROCHLORIDE 150 MG/1
TABLET, EXTENDED RELEASE ORAL
Qty: 60 TAB | Refills: 0 | Status: SHIPPED | OUTPATIENT
Start: 2017-11-07 | End: 2017-12-14 | Stop reason: SDUPTHER

## 2017-11-20 NOTE — TELEPHONE ENCOUNTER
Phone Number Called: [phone] (home)     Message: LVm for pt. Questions to call.     Left Message for patient to call back: N\A

## 2017-11-21 RX ORDER — BACLOFEN 10 MG/1
10 TABLET ORAL 3 TIMES DAILY
Qty: 90 TAB | Refills: 0 | Status: SHIPPED | OUTPATIENT
Start: 2017-11-21 | End: 2018-01-10 | Stop reason: SDUPTHER

## 2017-11-21 RX ORDER — ESCITALOPRAM OXALATE 20 MG/1
20 TABLET ORAL DAILY
Qty: 30 TAB | Refills: 0 | Status: SHIPPED | OUTPATIENT
Start: 2017-11-21 | End: 2018-01-10 | Stop reason: SDUPTHER

## 2017-12-14 DIAGNOSIS — Z76.0 MEDICATION REFILL: ICD-10-CM

## 2017-12-14 DIAGNOSIS — J43.0 UNILATERAL EMPHYSEMA (HCC): ICD-10-CM

## 2017-12-14 RX ORDER — BUPROPION HYDROCHLORIDE 150 MG/1
TABLET, EXTENDED RELEASE ORAL
Qty: 60 TAB | Refills: 0 | Status: SHIPPED | OUTPATIENT
Start: 2017-12-14 | End: 2018-01-10 | Stop reason: SDUPTHER

## 2017-12-27 DIAGNOSIS — R06.02 SOB (SHORTNESS OF BREATH) ON EXERTION: ICD-10-CM

## 2017-12-27 RX ORDER — ALBUTEROL SULFATE 90 UG/1
AEROSOL, METERED RESPIRATORY (INHALATION)
Qty: 1 INHALER | Refills: 3 | Status: SHIPPED | OUTPATIENT
Start: 2017-12-27 | End: 2018-01-30 | Stop reason: SDUPTHER

## 2018-01-06 ENCOUNTER — TELEPHONE (OUTPATIENT)
Dept: MEDICAL GROUP | Facility: CLINIC | Age: 73
End: 2018-01-06

## 2018-01-06 DIAGNOSIS — J43.9 PULMONARY EMPHYSEMA, UNSPECIFIED EMPHYSEMA TYPE (HCC): ICD-10-CM

## 2018-01-10 DIAGNOSIS — Z76.0 MEDICATION REFILL: ICD-10-CM

## 2018-01-10 DIAGNOSIS — J41.0 SIMPLE CHRONIC BRONCHITIS (HCC): ICD-10-CM

## 2018-01-10 RX ORDER — BACLOFEN 10 MG/1
10 TABLET ORAL 3 TIMES DAILY
Qty: 90 TAB | Refills: 0 | Status: SHIPPED | OUTPATIENT
Start: 2018-01-10 | End: 2018-02-05 | Stop reason: SDUPTHER

## 2018-01-10 RX ORDER — ESCITALOPRAM OXALATE 20 MG/1
20 TABLET ORAL DAILY
Qty: 30 TAB | Refills: 0 | Status: SHIPPED | OUTPATIENT
Start: 2018-01-10 | End: 2018-02-05 | Stop reason: SDUPTHER

## 2018-01-10 RX ORDER — BUPROPION HYDROCHLORIDE 150 MG/1
TABLET, EXTENDED RELEASE ORAL
Qty: 60 TAB | Refills: 0 | Status: SHIPPED | OUTPATIENT
Start: 2018-01-10 | End: 2018-02-05 | Stop reason: SDUPTHER

## 2018-01-30 ENCOUNTER — OFFICE VISIT (OUTPATIENT)
Dept: MEDICAL GROUP | Facility: CLINIC | Age: 73
End: 2018-01-30
Payer: MEDICARE

## 2018-01-30 VITALS
TEMPERATURE: 97.6 F | SYSTOLIC BLOOD PRESSURE: 126 MMHG | OXYGEN SATURATION: 97 % | WEIGHT: 127 LBS | HEIGHT: 62 IN | DIASTOLIC BLOOD PRESSURE: 68 MMHG | BODY MASS INDEX: 23.37 KG/M2 | RESPIRATION RATE: 18 BRPM | HEART RATE: 78 BPM

## 2018-01-30 DIAGNOSIS — F33.1 MODERATE EPISODE OF RECURRENT MAJOR DEPRESSIVE DISORDER (HCC): ICD-10-CM

## 2018-01-30 DIAGNOSIS — I10 ESSENTIAL HYPERTENSION: ICD-10-CM

## 2018-01-30 DIAGNOSIS — J44.9 CHRONIC OBSTRUCTIVE PULMONARY DISEASE, UNSPECIFIED COPD TYPE (HCC): ICD-10-CM

## 2018-01-30 DIAGNOSIS — R79.89 ELEVATED TSH: ICD-10-CM

## 2018-01-30 DIAGNOSIS — Z79.890 HORMONE REPLACEMENT THERAPY (POSTMENOPAUSAL): ICD-10-CM

## 2018-01-30 DIAGNOSIS — G89.29 CHRONIC LEFT HIP PAIN: ICD-10-CM

## 2018-01-30 DIAGNOSIS — M25.552 CHRONIC LEFT HIP PAIN: ICD-10-CM

## 2018-01-30 DIAGNOSIS — J96.11 CHRONIC RESPIRATORY FAILURE WITH HYPOXIA (HCC): ICD-10-CM

## 2018-01-30 DIAGNOSIS — R89.2 ABNORMAL DRUG SCREEN: ICD-10-CM

## 2018-01-30 DIAGNOSIS — G89.29 OTHER CHRONIC PAIN: ICD-10-CM

## 2018-01-30 DIAGNOSIS — Z23 NEED FOR INFLUENZA VACCINATION: ICD-10-CM

## 2018-01-30 DIAGNOSIS — F41.9 ANXIETY: ICD-10-CM

## 2018-01-30 PROBLEM — Z79.891 CHRONICALLY ON OPIATE THERAPY: Status: RESOLVED | Noted: 2017-04-06 | Resolved: 2018-01-30

## 2018-01-30 PROCEDURE — 99214 OFFICE O/P EST MOD 30 MIN: CPT | Mod: 25 | Performed by: NURSE PRACTITIONER

## 2018-01-30 PROCEDURE — 90662 IIV NO PRSV INCREASED AG IM: CPT | Performed by: NURSE PRACTITIONER

## 2018-01-30 PROCEDURE — G0008 ADMIN INFLUENZA VIRUS VAC: HCPCS | Performed by: NURSE PRACTITIONER

## 2018-01-30 RX ORDER — ALBUTEROL SULFATE 90 UG/1
AEROSOL, METERED RESPIRATORY (INHALATION)
Qty: 1 INHALER | Refills: 3 | Status: SHIPPED | OUTPATIENT
Start: 2018-01-30 | End: 2018-03-12 | Stop reason: SDUPTHER

## 2018-01-30 ASSESSMENT — PATIENT HEALTH QUESTIONNAIRE - PHQ9
SUM OF ALL RESPONSES TO PHQ QUESTIONS 1-9: 18
5. POOR APPETITE OR OVEREATING: 1 - SEVERAL DAYS
CLINICAL INTERPRETATION OF PHQ2 SCORE: 6

## 2018-01-30 NOTE — ASSESSMENT & PLAN NOTE
Patient has a long history of chronic pain. She was previously followed by Dr. Sargent, left hip, replacement? She was also previously prescribed chronic Norco by her prior PCP, we have since d/c that medication, she admits she does not seem to need it, however, she continues to complain of hip pain. Denies recent fall or injury.

## 2018-01-30 NOTE — PATIENT INSTRUCTIONS
Please obtain fasting labs prior to your next appointment. You may report to our clinic here in [place] Monday-Friday from 7:00am - 9:00am.     Please arrive fasting. Please do not eat or drink anything other than water for 8 hours prior to your arrival for labs.     Your medical care was provided today by: JULISSA Reza    Thank You for the opportunity to serve you.    You may receive a brief survey in the mail shortly regarding your visit today. Please take a few moments to complete the survey and return it; no postage is necessary. We are working to serve our patient population better, improve customer service and our patients overall experience and your input can help us to accomplish this. We thank you for your help and for the opportunity to serve you today and in the future.     Special Instructions:  Always call 9-1-1 immediately if you develop a life threatening emergency.    Unless told otherwise please take all medications as directed and complete prescription therapies.     Watch for the following signs that require additional evaluation: progressive lethargy or unresponsiveness, localized pain (chest, abdomen), shortness of breath, painful breathing, progressive vomiting with weakness, bloody stools, or new rash.     If you are prescribed pain medication or any other medication that is sedating, do not take medication before or while operating a vehicle or heavy machinery or equipment due to potential side effects such as drowsiness and/or dizziness.

## 2018-02-01 NOTE — PROGRESS NOTES
Subjective:     Sarah Rosas is a 72 y.o. female here today for evaluation and management of the following:     Chronic pain  Patient has a long history of chronic pain. She was previously followed by Dr. Sargent, left hip, replacement? She was also previously prescribed chronic Norco by her prior PCP, we have since d/c that medication, she admits she does not seem to need it, however, she continues to complain of hip pain. Denies recent fall or injury.     COPD (chronic obstructive pulmonary disease) (CMS-HCC)  Per patient she is followed by Dr. Rico, Pulm, once yearly. However, she does admit she has not been to see him in some time? She has several complaints today, however, SOB is not one of them.     Essential hypertension  Patient appears stable, although it is unclear which medication she is currently taking still. Her blood pressure today is 126/68. She denies chest pain.    Moderate episode of recurrent major depressive disorder (CMS-HCC)  Please see anxiety note.     Anxiety  Patient reports increased anxiety recently since her daughter passed away even still. She reports her daughter passed away from alcohol overdose, although this is unclear based on her report. She has been to see psychiatry in the past, but not in several years, she declines to see a counselor, reports that she would have to get a ride there and she would be mocked for it. Denies SI/HI. She continues to report things that make her depressed, openly discussing how she feels and tearful at times. She is prescribed Lexapro daily, which she reports does seem to help.     Chronic respiratory failure with hypoxia (CMS-HCC)  Patient is on continuous 2 L O2.         Abnormal drug screen  Hx of abnormal drug screen, took patient one month to comply with testing. She is aware we will not prescribe chronic opiates.     Hormone replacement therapy (postmenopausal)  This is a chronic problem. Hysterectomy due to endometriosis 1972,  despite known risks, patient continues to insist on HRT.     Elevated TSH  Patient appears to have a history of elevated TSH, this does not appear to have been addressed by her prior PCP JULISSA Giordano? Patient reports having no knowledge of this? She has never taking medication for hypothyroid.     Chronic left hip pain  See chronic pain note.          Current medicines (including changes today)  Current Outpatient Prescriptions   Medication Sig Dispense Refill   • NON SPECIFIED      • albuterol (PROAIR HFA) 108 (90 Base) MCG/ACT Aero Soln inhalation aerosol INHALE TWO PUFFS BY MOUTH EVERY 6 HOURS AS NEEDED FOR SHORTNESS OF BREATH 1 Inhaler 3   • baclofen (LIORESAL) 10 MG Tab Take 1 Tab by mouth 3 times a day. 90 Tab 0   • escitalopram (LEXAPRO) 20 MG tablet Take 1 Tab by mouth every day. 30 Tab 0   • buPROPion SR (WELLBUTRIN-SR) 150 MG TABLET SR 12 HR sustained-release tablet TAKE ONE TABLET BY MOUTH TWICE A DAY 60 Tab 0   • conjugated estrogen (PREMARIN) 0.625 MG Tab TAKE ONE TABLET BY MOUTH DAILY 90 Tab 0   • fluticasone-salmeterol (ADVAIR DISKUS) 100-50 MCG/DOSE AEROSOL POWDER, BREATH ACTIVATED Inhale 1 Puff by mouth every 12 hours. 1 Inhaler 0   • gabapentin (NEURONTIN) 600 MG tablet Take 1 Tab by mouth 2 times a day. 90 Tab 3   • Cholecalciferol (VITAMIN D3) 2000 UNIT Cap Take 1 Cap by mouth every day. 30 Cap 5   • lisinopril (PRINIVIL) 10 MG Tab TAKE ONE TABLET BY MOUTH ONCE A DAY 90 Tab 1   • trazodone (DESYREL) 100 MG Tab Take 2 Tabs by mouth at bedtime as needed for Sleep. AS NEEDED FOR SLEEP 60 Tab 2   • ipratropium (ATROVENT) 0.03 % Solution Spray 2 Sprays in nose every 12 hours. 1 Bottle 2   • ferrous sulfate 325 (65 FE) MG tablet Take 1 Tab by mouth every day. 30 Tab 2   • olopatadine (PATANOL) 0.1 % ophthalmic solution PLACE 1 DROP IN EACH EYE TWO TIMES A DAY 1 Bottle 2   • albuterol (PROVENTIL) 2.5mg/3ml Nebu Soln solution for nebulization 2.5 mg by Nebulization route every four hours as needed  for Shortness of Breath.     • formoterol fumarate (PERFOROMIST) 20 MCG/2ML Nebu Soln Inhale 20 mcg by mouth.     • aspirin (ASA) 325 MG Tab Take 325 mg by mouth every 6 hours as needed.     • ondansetron (ZOFRAN ODT) 4 MG TABLET DISPERSIBLE Take 1 Tab by mouth every 8 hours as needed for Nausea/Vomiting. 10 Tab 0   • NON SPECIFIED O2 sat at 84% on room air in office, needs O2 24/7 and would like portable concentrator 1 Each 0   • Multiple Vitamins-Minerals (WOMENS MULTI VITAMIN & MINERAL) TABS Take  by mouth every day.     • pravastatin (PRAVACHOL) 20 MG TABS Take 1 Tab by mouth every bedtime. 90 Tab 3     No current facility-administered medications for this visit.        She  has a past medical history of Anxiety and depression (4/2/2015); Back pain; Bipolar affect, depressed (CMS-HCC) (6/23/2012); and COPD (chronic obstructive pulmonary disease) (CMS-HCC).    She  has a past surgical history that includes hysterectomy, total abdominal; endometrial ablation; appendectomy; and hip nailing intramedullary (Left).     Social History     Social History   • Marital status: Legally      Spouse name: N/A   • Number of children: N/A   • Years of education: N/A     Social History Main Topics   • Smoking status: Former Smoker     Packs/day: 1.00     Years: 25.00     Types: Cigarettes     Quit date: 3/1/2015   • Smokeless tobacco: Never Used      Comment: Quit March 2015   • Alcohol use No   • Drug use: No   • Sexual activity: Not on file     Other Topics Concern   • Not on file     Social History Narrative   • No narrative on file       Family History   Problem Relation Age of Onset   • Heart Disease Father 61   • Other Sister    • Heart Disease Sister      Pacemaker         ROS  Positive for chronic pain, left hip. Depression, no SI.   No fever, no chest pain, no increased shortness of breath, no abdominal pain, no rashes    All other systems reviewed and are negative.        Objective:     Blood pressure 126/68,  "pulse 78, temperature 36.4 °C (97.6 °F), resp. rate 18, height 1.575 m (5' 2\"), weight 57.6 kg (127 lb), SpO2 97 %. Body mass index is 23.23 kg/m².    Physical Exam:   Constitutional: Alert, no distress. On O2.   Eye: Equal, round and reactive, conjunctiva clear, lids normal.   ENMT: Lips without lesions, oropharynx clear.   Neck: Trachea midline, no masses, no thyromegaly. No cervical or supraclavicular lymphadenopathy  Respiratory: Unlabored respiratory effort, lungs clear to auscultation, no wheezes, no ronchi. Diminished bases.   Cardiovascular: Normal S1, S2, no murmur, no edema.   Skin: Warm, dry, good turgor, no rashes in visible areas.  Psych: Alert and oriented x3, normal affect and mood.    HIP Exam:      Gait: Normal  Patient is not using any assistive device today.      ROM: Abduction:  45 deg, with reported pain                 Adduction:  25 deg                Internal Rotation:  45 deg                External Rotation:  45 deg     Straight leg raise:   Normal bilaterally without pain   Neuro: Alert, Oriented X 3, cooperative, moving all extremities. Full/symmetrical motor strength. Normal/symmetrical DTR's. Normal proximal/distal sensory in extremities.  SKIN: No rashes, redness, heat or signs of infection noted at joint.     RADIOGRAPHS: will obtain updated xray      Assessment and Plan:   The following treatment plan was discussed    1. Chronic obstructive pulmonary disease, unspecified COPD type (CMS-HCC)  Advised patient to make appt with Pulm. Discussed s/s to seek emergent care, today she only requests a refill of albuterol.   - albuterol (PROAIR HFA) 108 (90 Base) MCG/ACT Aero Soln inhalation aerosol; INHALE TWO PUFFS BY MOUTH EVERY 6 HOURS AS NEEDED FOR SHORTNESS OF BREATH  Dispense: 1 Inhaler; Refill: 3    2. Need for influenza vaccination  - INFLUENZA VACCINE, HIGH DOSE (65+ ONLY)    3. Hormone replacement therapy (postmenopausal)  Discussed again risks, patient adamant to continue on " therapy.     4. Essential hypertension  Advised to complete fasting labs, RTC to discuss results.   - LIPID PROFILE; Future  - COMP METABOLIC PANEL; Future    5. Other chronic pain    6. Chronic respiratory failure with hypoxia (CMS-HCC)    7. Anxiety  I have encouraged patient to seek counseling, here today she seems to want to talk about her grief.   - Patient has been identified as being depressed and appropriate orders and counseling have been given    8. Moderate episode of recurrent major depressive disorder (CMS-HCC)  - Patient has been identified as being depressed and appropriate orders and counseling have been given    9. Elevated TSH  Advised to repeat testing, pending results, may consider treatment.   - TSH WITH REFLEX TO FT4; Future    10. Chronic left hip pain  At this time advised to get updated imaging, may consider referral to Ortho, she declines PT, lack of transportation. She is at least no longer taking chronic opiate medication for pain.   - DX-HIP-COMPLETE - UNILATERAL 2+ LEFT; Future    11. Abnormal drug screen      Reviewed indication, dosage, usage and potential adverse effects of prescribed medications. Patient appears to understand, verbalizes understanding and is willing to try medications as prescribed.      Reviewed risks and benefits of treatment plan. Patient verbally agrees to plan of care.       Followup: Return in about 2 months (around 3/30/2018) for Lab follow up.    GRETTA CasianoRFLEX.     PLEASE NOTE: This dictation was created using voice recognition software. I have made every reasonable attempt to correct obvious errors, but I expect that there may be errors of grammar and possibly content that I did not discover prior finalizing this note.

## 2018-02-01 NOTE — ASSESSMENT & PLAN NOTE
Hx of abnormal drug screen, took patient one month to comply with testing. She is aware we will not prescribe chronic opiates.

## 2018-02-01 NOTE — ASSESSMENT & PLAN NOTE
Patient reports increased anxiety recently since her daughter passed away even still. She reports her daughter passed away from alcohol overdose, although this is unclear based on her report. She has been to see psychiatry in the past, but not in several years, she declines to see a counselor, reports that she would have to get a ride there and she would be mocked for it. Denies SI/HI. She continues to report things that make her depressed, openly discussing how she feels and tearful at times. She is prescribed Lexapro daily, which she reports does seem to help.

## 2018-02-01 NOTE — ASSESSMENT & PLAN NOTE
Patient appears to have a history of elevated TSH, this does not appear to have been addressed by her prior PCP JULISSA Giordano? Patient reports having no knowledge of this? She has never taking medication for hypothyroid.

## 2018-02-01 NOTE — ASSESSMENT & PLAN NOTE
Patient appears stable, although it is unclear which medication she is currently taking still. Her blood pressure today is 126/68. She denies chest pain.

## 2018-02-01 NOTE — ASSESSMENT & PLAN NOTE
Per patient she is followed by Eduardo Trent, once yearly. However, she does admit she has not been to see him in some time? She has several complaints today, however, SOB is not one of them.

## 2018-02-01 NOTE — ASSESSMENT & PLAN NOTE
This is a chronic problem. Hysterectomy due to endometriosis 1972, despite known risks, patient continues to insist on HRT.

## 2018-02-05 DIAGNOSIS — J43.9 PULMONARY EMPHYSEMA, UNSPECIFIED EMPHYSEMA TYPE (HCC): ICD-10-CM

## 2018-02-05 DIAGNOSIS — D50.9 IRON DEFICIENCY ANEMIA, UNSPECIFIED IRON DEFICIENCY ANEMIA TYPE: ICD-10-CM

## 2018-02-05 DIAGNOSIS — F31.30 BIPOLAR AFFECTIVE DISORDER, CURRENT EPISODE DEPRESSED, CURRENT EPISODE SEVERITY UNSPECIFIED (HCC): ICD-10-CM

## 2018-02-05 DIAGNOSIS — Z76.0 MEDICATION REFILL: ICD-10-CM

## 2018-02-07 RX ORDER — BACLOFEN 10 MG/1
10 TABLET ORAL 3 TIMES DAILY
Qty: 90 TAB | Refills: 2 | Status: SHIPPED | OUTPATIENT
Start: 2018-02-07 | End: 2018-04-20 | Stop reason: SDUPTHER

## 2018-02-07 RX ORDER — TRAZODONE HYDROCHLORIDE 100 MG/1
200 TABLET ORAL NIGHTLY PRN
Qty: 60 TAB | Refills: 2 | Status: SHIPPED | OUTPATIENT
Start: 2018-02-07 | End: 2018-04-20 | Stop reason: SDUPTHER

## 2018-02-07 RX ORDER — IPRATROPIUM BROMIDE 21 UG/1
2 SPRAY, METERED NASAL EVERY 12 HOURS
Qty: 1 BOTTLE | Refills: 2 | Status: SHIPPED | OUTPATIENT
Start: 2018-02-07 | End: 2018-09-05 | Stop reason: SDUPTHER

## 2018-02-07 RX ORDER — ESCITALOPRAM OXALATE 20 MG/1
20 TABLET ORAL DAILY
Qty: 30 TAB | Refills: 2 | Status: SHIPPED | OUTPATIENT
Start: 2018-02-07 | End: 2018-04-20 | Stop reason: SDUPTHER

## 2018-02-07 RX ORDER — BUPROPION HYDROCHLORIDE 150 MG/1
TABLET, EXTENDED RELEASE ORAL
Qty: 60 TAB | Refills: 2 | Status: SHIPPED | OUTPATIENT
Start: 2018-02-07 | End: 2018-07-30 | Stop reason: SDUPTHER

## 2018-02-07 RX ORDER — FERROUS SULFATE 325(65) MG
325 TABLET ORAL DAILY
Qty: 30 TAB | Refills: 2 | Status: SHIPPED | OUTPATIENT
Start: 2018-02-07 | End: 2018-07-30 | Stop reason: SDUPTHER

## 2018-02-08 RX ORDER — LISINOPRIL 10 MG/1
TABLET ORAL
Qty: 90 TAB | Refills: 1 | Status: SHIPPED | OUTPATIENT
Start: 2018-02-08 | End: 2018-04-20 | Stop reason: SDUPTHER

## 2018-02-09 DIAGNOSIS — H01.119 EYELID DERMATITIS, ALLERGIC/CONTACT, UNSPECIFIED LATERALITY: ICD-10-CM

## 2018-02-12 RX ORDER — OLOPATADINE HYDROCHLORIDE 1 MG/ML
SOLUTION/ DROPS OPHTHALMIC
Qty: 1 BOTTLE | Refills: 0 | Status: SHIPPED | OUTPATIENT
Start: 2018-02-12 | End: 2018-06-21 | Stop reason: SDUPTHER

## 2018-02-12 NOTE — TELEPHONE ENCOUNTER
Refill completed. Please advise patient if she continues to have eye symptoms will need an appt. Thank you.   JERED Casiano.

## 2018-03-12 DIAGNOSIS — J44.9 CHRONIC OBSTRUCTIVE PULMONARY DISEASE, UNSPECIFIED COPD TYPE (HCC): ICD-10-CM

## 2018-03-12 DIAGNOSIS — J43.9 PULMONARY EMPHYSEMA, UNSPECIFIED EMPHYSEMA TYPE (HCC): ICD-10-CM

## 2018-03-12 DIAGNOSIS — E55.9 VITAMIN D DEFICIENCY: ICD-10-CM

## 2018-03-13 RX ORDER — ACETAMINOPHEN 160 MG
2000 TABLET,DISINTEGRATING ORAL DAILY
Qty: 90 CAP | Refills: 3 | Status: SHIPPED | OUTPATIENT
Start: 2018-03-13 | End: 2018-04-20 | Stop reason: SDUPTHER

## 2018-03-13 RX ORDER — ALBUTEROL SULFATE 90 UG/1
AEROSOL, METERED RESPIRATORY (INHALATION)
Qty: 1 INHALER | Refills: 5 | Status: SHIPPED | OUTPATIENT
Start: 2018-03-13 | End: 2018-11-30 | Stop reason: SDUPTHER

## 2018-03-15 DIAGNOSIS — J43.0 UNILATERAL EMPHYSEMA (HCC): ICD-10-CM

## 2018-04-11 NOTE — TELEPHONE ENCOUNTER
Was the patient seen in the last year in this department? Yes     Does patient have an active prescription for medications requested? No  - patients old pulse oximeter keyon    Received Request Via: Patient

## 2018-04-20 DIAGNOSIS — J43.0 UNILATERAL EMPHYSEMA (HCC): ICD-10-CM

## 2018-04-20 DIAGNOSIS — J43.9 PULMONARY EMPHYSEMA, UNSPECIFIED EMPHYSEMA TYPE (HCC): ICD-10-CM

## 2018-04-20 DIAGNOSIS — F31.30 BIPOLAR AFFECTIVE DISORDER, CURRENT EPISODE DEPRESSED, CURRENT EPISODE SEVERITY UNSPECIFIED (HCC): ICD-10-CM

## 2018-04-20 DIAGNOSIS — D50.9 IRON DEFICIENCY ANEMIA, UNSPECIFIED IRON DEFICIENCY ANEMIA TYPE: ICD-10-CM

## 2018-04-20 DIAGNOSIS — E78.5 DYSLIPIDEMIA: ICD-10-CM

## 2018-04-20 RX ORDER — FERROUS SULFATE 325(65) MG
325 TABLET ORAL DAILY
Qty: 30 TAB | Refills: 2 | OUTPATIENT
Start: 2018-04-20

## 2018-04-20 RX ORDER — TRAZODONE HYDROCHLORIDE 100 MG/1
200 TABLET ORAL NIGHTLY PRN
Qty: 60 TAB | Refills: 2 | Status: SHIPPED | OUTPATIENT
Start: 2018-04-20 | End: 2018-09-05 | Stop reason: SDUPTHER

## 2018-04-20 RX ORDER — ESCITALOPRAM OXALATE 20 MG/1
20 TABLET ORAL DAILY
Qty: 30 TAB | Refills: 2 | Status: SHIPPED | OUTPATIENT
Start: 2018-04-20 | End: 2018-09-05 | Stop reason: SDUPTHER

## 2018-04-20 RX ORDER — PRAVASTATIN SODIUM 20 MG
20 TABLET ORAL
Qty: 90 TAB | Refills: 3 | Status: SHIPPED | OUTPATIENT
Start: 2018-04-20 | End: 2019-04-08 | Stop reason: SDUPTHER

## 2018-04-20 RX ORDER — FORMOTEROL FUMARATE DIHYDRATE 20 UG/2ML
20 SOLUTION RESPIRATORY (INHALATION)
Qty: 120 ML | Status: CANCELLED | OUTPATIENT
Start: 2018-04-20

## 2018-04-20 RX ORDER — GABAPENTIN 600 MG/1
600 TABLET ORAL 2 TIMES DAILY
Qty: 90 TAB | Refills: 3 | Status: SHIPPED | OUTPATIENT
Start: 2018-04-20 | End: 2018-11-30 | Stop reason: SDUPTHER

## 2018-04-20 RX ORDER — BACLOFEN 10 MG/1
10 TABLET ORAL 3 TIMES DAILY
Qty: 90 TAB | Refills: 2 | Status: SHIPPED | OUTPATIENT
Start: 2018-04-20 | End: 2018-09-05 | Stop reason: SDUPTHER

## 2018-04-20 RX ORDER — ACETAMINOPHEN 160 MG
2000 TABLET,DISINTEGRATING ORAL DAILY
Qty: 90 CAP | Refills: 3 | Status: SHIPPED | OUTPATIENT
Start: 2018-04-20 | End: 2019-03-15 | Stop reason: SDUPTHER

## 2018-04-20 RX ORDER — LISINOPRIL 10 MG/1
TABLET ORAL
Qty: 90 TAB | Refills: 1 | Status: SHIPPED | OUTPATIENT
Start: 2018-04-20 | End: 2018-11-05 | Stop reason: SDUPTHER

## 2018-04-20 NOTE — TELEPHONE ENCOUNTER
Refill completed. Patient should keep her appt with Gail, can discuss if she still needs iron pills. Thank you.   JERED Casiano.

## 2018-05-08 ENCOUNTER — OFFICE VISIT (OUTPATIENT)
Dept: MEDICAL GROUP | Facility: CLINIC | Age: 73
End: 2018-05-08
Payer: MEDICARE

## 2018-05-08 VITALS
DIASTOLIC BLOOD PRESSURE: 60 MMHG | WEIGHT: 130 LBS | RESPIRATION RATE: 20 BRPM | SYSTOLIC BLOOD PRESSURE: 126 MMHG | OXYGEN SATURATION: 92 % | HEIGHT: 62 IN | TEMPERATURE: 97.9 F | BODY MASS INDEX: 23.92 KG/M2 | HEART RATE: 79 BPM

## 2018-05-08 DIAGNOSIS — G89.29 OTHER CHRONIC PAIN: ICD-10-CM

## 2018-05-08 DIAGNOSIS — J44.9 CHRONIC OBSTRUCTIVE PULMONARY DISEASE, UNSPECIFIED COPD TYPE (HCC): ICD-10-CM

## 2018-05-08 DIAGNOSIS — F33.1 MODERATE EPISODE OF RECURRENT MAJOR DEPRESSIVE DISORDER (HCC): ICD-10-CM

## 2018-05-08 DIAGNOSIS — M25.552 CHRONIC LEFT HIP PAIN: ICD-10-CM

## 2018-05-08 DIAGNOSIS — J96.11 CHRONIC RESPIRATORY FAILURE WITH HYPOXIA (HCC): ICD-10-CM

## 2018-05-08 DIAGNOSIS — Z23 NEED FOR VACCINATION: ICD-10-CM

## 2018-05-08 DIAGNOSIS — G89.29 CHRONIC LEFT HIP PAIN: ICD-10-CM

## 2018-05-08 PROCEDURE — 99214 OFFICE O/P EST MOD 30 MIN: CPT | Mod: 25 | Performed by: PHYSICIAN ASSISTANT

## 2018-05-08 PROCEDURE — G0009 ADMIN PNEUMOCOCCAL VACCINE: HCPCS | Performed by: PHYSICIAN ASSISTANT

## 2018-05-08 PROCEDURE — 90732 PPSV23 VACC 2 YRS+ SUBQ/IM: CPT | Performed by: PHYSICIAN ASSISTANT

## 2018-05-08 ASSESSMENT — PATIENT HEALTH QUESTIONNAIRE - PHQ9
5. POOR APPETITE OR OVEREATING: 0 - NOT AT ALL
SUM OF ALL RESPONSES TO PHQ QUESTIONS 1-9: 11
CLINICAL INTERPRETATION OF PHQ2 SCORE: 4

## 2018-05-08 NOTE — ASSESSMENT & PLAN NOTE
Patient reports increased anxiety recently since her daughter passed away even still. She reports her daughter passed away in 2016. She has been to see psychiatry in the past, but not in several years, she declines to see a counselor again today, reports that she would have to get a ride there and she would be mocked for it. Denies suicidal and homicidal ideations. She continues to report things that make her depressed, openly discussing how she feels and tearful at times. She is prescribed Lexapro daily, which she reports she is unsure if it is helping. She believes her depression will be resolved once she is able to get out of her home more with a new wheelchair.

## 2018-05-08 NOTE — ASSESSMENT & PLAN NOTE
PAtient denies recent fall or injury although she contiues to complain of hip pain since her fall which resulted in fracture and possible hip replacement? In 2015.

## 2018-05-08 NOTE — ASSESSMENT & PLAN NOTE
Patient has not been seen by pulmonology in over a year and a half for this problem. She states that she typically only goes to see Dr. Rico as needed when her symptoms become worse. She continues wearing 24-hour oxygen and using Advair, ipratropium inhaled spray daily and albuterol as needed.

## 2018-05-08 NOTE — PROGRESS NOTES
Chief Complaint   Patient presents with   • Hypertension   • Other     Power chair       HISTORY OF PRESENT ILLNESS: Patient is a 73 y.o. female established patient who presents today for evaluation and management of:    Moderate episode of recurrent major depressive disorder (CMS-HCC) (Formerly [facility])  Patient reports increased anxiety recently since her daughter passed away even still. She reports her daughter passed away in 2016. She has been to see psychiatry in the past, but not in several years, she declines to see a counselor again today, reports that she would have to get a ride there and she would be mocked for it. Denies suicidal and homicidal ideations. She continues to report things that make her depressed, openly discussing how she feels and tearful at times. She is prescribed Lexapro daily, which she reports she is unsure if it is helping. She believes her depression will be resolved once she is able to get out of her home more with a new wheelchair.    COPD (chronic obstructive pulmonary disease) (CMS-HCC)  Patient has not been seen by pulmonology in over a year and a half for this problem. She states that she typically only goes to see Dr. Rico as needed when her symptoms become worse. She continues wearing 24-hour oxygen and using Advair, ipratropium inhaled spray daily and albuterol as needed.    Chronic respiratory failure with hypoxia (CMS-HCC)  Patient is on continuous oxygen via nasal cannula at 2L.     Chronic left hip pain  PAtient denies recent fall or injury although she contiues to complain of hip pain since her fall which resulted in fracture and possible hip replacement? In 2015.       Patient Active Problem List    Diagnosis Date Noted   • Hormone replacement therapy (postmenopausal) 01/30/2018   • Elevated TSH 01/30/2018   • Chronic left hip pain 01/30/2018   • Abnormal drug screen 10/09/2017   • Chronic pain 02/23/2017   • Chronic respiratory failure with hypoxia (CMS-[facility]) 06/08/2016   •  Anxiety 05/20/2015   • Moderate episode of recurrent major depressive disorder (HCC) 04/02/2015   • Essential hypertension 04/18/2013   • COPD (chronic obstructive pulmonary disease) (Formerly [facility])        Allergies:Bee    Current Outpatient Prescriptions   Medication Sig Dispense Refill   • Misc. Devices (TRANSPORT CHAIR) Misc Requires power chair due to inability to walk farther than 200 feet without struggling to breathe 1 Each 0   • pravastatin (PRAVACHOL) 20 MG Tab Take 1 Tab by mouth every bedtime. 90 Tab 3   • lisinopril (PRINIVIL) 10 MG Tab TAKE ONE TABLET BY MOUTH ONCE A DAY 90 Tab 1   • gabapentin (NEURONTIN) 600 MG tablet Take 1 Tab by mouth 2 times a day. 90 Tab 3   • conjugated estrogen (PREMARIN) 0.625 MG Tab TAKE ONE TABLET BY MOUTH DAILY 90 Tab 0   • escitalopram (LEXAPRO) 20 MG tablet Take 1 Tab by mouth every day. 30 Tab 2   • fluticasone-salmeterol (ADVAIR DISKUS) 100-50 MCG/DOSE AEROSOL POWDER, BREATH ACTIVATED Inhale 1 Puff by mouth every 12 hours. 1 Inhaler 2   • baclofen (LIORESAL) 10 MG Tab Take 1 Tab by mouth 3 times a day. 90 Tab 2   • Cholecalciferol (VITAMIN D3) 2000 UNIT Cap Take 1 Cap by mouth every day. 90 Cap 3   • traZODone (DESYREL) 100 MG Tab Take 2 Tabs by mouth at bedtime as needed for Sleep. AS NEEDED FOR SLEEP 60 Tab 2   • Misc. Devices (PULSE OXIMETER) Misc 1 Device by Does not apply route every day. 1 Each 0   • albuterol (PROAIR HFA) 108 (90 Base) MCG/ACT Aero Soln inhalation aerosol INHALE TWO PUFFS BY MOUTH EVERY 6 HOURS AS NEEDED FOR SHORTNESS OF BREATH 1 Inhaler 5   • olopatadine (PATANOL) 0.1 % ophthalmic solution PLACE 1 DROP IN EACH EYE TWO TIMES A DAY 1 Bottle 0   • ipratropium (ATROVENT) 0.03 % Solution Spray 2 Sprays in nose every 12 hours. 1 Bottle 2   • buPROPion SR (WELLBUTRIN-SR) 150 MG TABLET SR 12 HR sustained-release tablet TAKE ONE TABLET BY MOUTH TWICE A DAY 60 Tab 2   • ferrous sulfate 325 (65 Fe) MG tablet Take 1 Tab by mouth every day. 30 Tab 2   • NON SPECIFIED       • albuterol (PROVENTIL) 2.5mg/3ml Nebu Soln solution for nebulization 2.5 mg by Nebulization route every four hours as needed for Shortness of Breath.     • formoterol fumarate (PERFOROMIST) 20 MCG/2ML Nebu Soln Inhale 20 mcg by mouth.     • aspirin (ASA) 325 MG Tab Take 325 mg by mouth every 6 hours as needed.     • ondansetron (ZOFRAN ODT) 4 MG TABLET DISPERSIBLE Take 1 Tab by mouth every 8 hours as needed for Nausea/Vomiting. 10 Tab 0   • NON SPECIFIED O2 sat at 84% on room air in office, needs O2 24/7 and would like portable concentrator 1 Each 0   • Multiple Vitamins-Minerals (WOMENS MULTI VITAMIN & MINERAL) TABS Take  by mouth every day.       No current facility-administered medications for this visit.        Social History   Substance Use Topics   • Smoking status: Former Smoker     Packs/day: 1.00     Years: 40.00     Types: Cigarettes     Quit date: 3/1/2015   • Smokeless tobacco: Never Used   • Alcohol use No       Family Status   Relation Status   • Father    • Mother    • Sister    • Brother Alive   • Sister    • Sister Alive   • Sister Alive   • Sister Alive     Family History   Problem Relation Age of Onset   • Heart Disease Father 61   • Other Sister    • Heart Disease Sister      Pacemaker       Review of Systems:   Constitutional: Negative for fever, chills, weight loss and malaise. Positive for fatigue.   HENT: Negative for ear pain, nosebleeds, congestion, sore throat and neck pain.    Eyes: Negative for blurred vision.   Respiratory: Positive for cough, shortness of breath and wheezing.    Cardiovascular: Negative for chest pain, palpitations, orthopnea and leg swelling.   Musculoskeletal: Negative for myalgias. Positive for back pain and joint pain.   Skin: Negative for rash and itching.   Neurological: Negative for dizziness and headaches.   Endo/Heme/Allergies: Does not bruise/bleed easily.   Psychiatric/Behavioral: Positive for moderately-controlled  "depression. Negative for suicidal ideas and memory loss. The patient is nervous/anxious and does have insomnia controlled with trazodone.      Exam:  Blood pressure 126/60, pulse 79, temperature 36.6 °C (97.9 °F), resp. rate 20, height 1.575 m (5' 2\"), weight 59 kg (130 lb), SpO2 92 %.  Body mass index is 23.78 kg/m².  General:  Well Developed, Well Nourished female in mild chronic respiratory distress.   Head: is grossly normal.  Neck: Supple without masses. Thyroid is not visibly enlarged.  Pulmonary: Faint breath sounds to auscultation. Clear to ausculation. Normal effort. No rales, ronchi, or wheezing.  Cardiovascular: Regular rate and rhythm without murmur. Carotid and radial pulses are intact and equal bilaterally.  Extremities: no clubbing, cyanosis, or edema.    Medical decision-making and discussion:  1. Chronic respiratory failure with hypoxia (CMS-HCC)  This patient was advised to follow up with pulmonology in the near future for continued monitoring.  - ND PORTABLE OXYGEN CONCENTRATOR  - Misc. Devices (TRANSPORT CHAIR) Misc; Requires power chair due to inability to walk farther than 200 feet without struggling to breathe  Dispense: 1 Each; Refill: 0  - REFERRAL TO PHYSICAL THERAPY Reason for Therapy: Eval/Treat/Report    2. Moderate episode of recurrent major depressive disorder (HCC)  This patient is refusing medication change and counseling at this time, insisting that obtaining a power chair will alleviate her depression once she is able to have increased freedom away from her home.  - Patient has been identified as being depressed and appropriate orders and counseling have been given    3. Chronic obstructive pulmonary disease, unspecified COPD type (HCC)  This patient was encouraged to follow up with pulmonology but refuses as she doesn't feel her breathing is too bad at this time.   - ND PORTABLE OXYGEN CONCENTRATOR  - Misc. Devices (TRANSPORT CHAIR) Misc; Requires power chair due to inability to " walk farther than 200 feet without struggling to breathe  Dispense: 1 Each; Refill: 0  - REFERRAL TO PHYSICAL THERAPY Reason for Therapy: Eval/Treat/Report    4. Chronic left hip pain  - Misc. Devices (TRANSPORT CHAIR) Misc; Requires power chair due to inability to walk farther than 200 feet without struggling to breathe  Dispense: 1 Each; Refill: 0  - REFERRAL TO PHYSICAL THERAPY Reason for Therapy: Eval/Treat/Report    5. Other chronic pain  - Misc. Devices (TRANSPORT CHAIR) Misc; Requires power chair due to inability to walk farther than 200 feet without struggling to breathe  Dispense: 1 Each; Refill: 0  - REFERRAL TO PHYSICAL THERAPY Reason for Therapy: Eval/Treat/Report    6. Need for vaccination  - PneumoVax PPV23 =>3yo    Please note that this dictation was created using voice recognition software. I have made every reasonable attempt to correct obvious errors, but I expect that there are errors of grammar and possibly content that I did not discover before finalizing the note.      Return if symptoms worsen or fail to improve.

## 2018-06-21 DIAGNOSIS — H01.119 EYELID DERMATITIS, ALLERGIC/CONTACT, UNSPECIFIED LATERALITY: ICD-10-CM

## 2018-06-21 RX ORDER — OLOPATADINE HYDROCHLORIDE 1 MG/ML
SOLUTION/ DROPS OPHTHALMIC
Qty: 1 BOTTLE | Refills: 0 | Status: SHIPPED | OUTPATIENT
Start: 2018-06-21 | End: 2018-07-30 | Stop reason: SDUPTHER

## 2018-06-21 NOTE — TELEPHONE ENCOUNTER
Was the patient seen in the last year in this department? Yes     Does patient have an active prescription for medications requested? Yes     Received Request Via: Patient     Last seen 5/8/18

## 2018-06-28 ENCOUNTER — TELEPHONE (OUTPATIENT)
Dept: MEDICAL GROUP | Facility: CLINIC | Age: 73
End: 2018-06-28

## 2018-06-28 NOTE — TELEPHONE ENCOUNTER
Phone Number: Beatriz from [employer] [phone] ext. [phone] Fax: [phone]    Message: Beatriz called asking for orders for [employer]. Stated she faxed the over on 6/19/18. Informed her I looked in chart and only see the old orders. Informed her medical assistant in clinic will call her back.    MA to call back: yes

## 2018-06-28 NOTE — TELEPHONE ENCOUNTER
This was signed and faxed over on 6/26/18.  I refaxed and notified Beatriz that I did so.  Gave her our direct line incase she does not receive it.

## 2018-09-05 DIAGNOSIS — H01.119 EYELID DERMATITIS, ALLERGIC/CONTACT, UNSPECIFIED LATERALITY: ICD-10-CM

## 2018-09-05 DIAGNOSIS — F31.30 BIPOLAR AFFECTIVE DISORDER, CURRENT EPISODE DEPRESSED, CURRENT EPISODE SEVERITY UNSPECIFIED (HCC): ICD-10-CM

## 2018-09-06 RX ORDER — IPRATROPIUM BROMIDE 21 UG/1
2 SPRAY, METERED NASAL EVERY 12 HOURS
Qty: 30 ML | Refills: 2 | Status: SHIPPED | OUTPATIENT
Start: 2018-09-06 | End: 2018-11-30 | Stop reason: SDUPTHER

## 2018-09-06 RX ORDER — BACLOFEN 10 MG/1
TABLET ORAL
Qty: 90 TAB | Refills: 2 | Status: SHIPPED | OUTPATIENT
Start: 2018-09-06 | End: 2018-11-30 | Stop reason: SDUPTHER

## 2018-09-06 RX ORDER — ESCITALOPRAM OXALATE 20 MG/1
TABLET ORAL
Qty: 30 TAB | Refills: 2 | Status: SHIPPED | OUTPATIENT
Start: 2018-09-06 | End: 2018-11-30 | Stop reason: SDUPTHER

## 2018-09-06 RX ORDER — TRAZODONE HYDROCHLORIDE 100 MG/1
TABLET ORAL
Qty: 60 TAB | Refills: 2 | Status: SHIPPED | OUTPATIENT
Start: 2018-09-06 | End: 2018-11-30 | Stop reason: SDUPTHER

## 2018-09-06 RX ORDER — OLOPATADINE HYDROCHLORIDE 1 MG/ML
SOLUTION/ DROPS OPHTHALMIC
Qty: 5 ML | Refills: 0 | Status: SHIPPED | OUTPATIENT
Start: 2018-09-06 | End: 2018-11-05 | Stop reason: SDUPTHER

## 2018-10-11 DIAGNOSIS — H01.119 EYELID DERMATITIS, ALLERGIC/CONTACT, UNSPECIFIED LATERALITY: ICD-10-CM

## 2018-10-12 RX ORDER — OLOPATADINE HYDROCHLORIDE 1 MG/ML
SOLUTION/ DROPS OPHTHALMIC
Qty: 5 ML | Refills: 0 | Status: SHIPPED | OUTPATIENT
Start: 2018-10-12 | End: 2018-11-13

## 2018-11-05 DIAGNOSIS — J43.0 UNILATERAL EMPHYSEMA (HCC): ICD-10-CM

## 2018-11-05 DIAGNOSIS — J43.9 PULMONARY EMPHYSEMA, UNSPECIFIED EMPHYSEMA TYPE (HCC): ICD-10-CM

## 2018-11-05 DIAGNOSIS — H01.119 EYELID DERMATITIS, ALLERGIC/CONTACT, UNSPECIFIED LATERALITY: ICD-10-CM

## 2018-11-05 RX ORDER — LISINOPRIL 10 MG/1
TABLET ORAL
Qty: 90 TAB | Refills: 0 | Status: SHIPPED | OUTPATIENT
Start: 2018-11-05

## 2018-11-05 RX ORDER — OLOPATADINE HYDROCHLORIDE 1 MG/ML
SOLUTION/ DROPS OPHTHALMIC
Qty: 5 ML | Refills: 0 | Status: SHIPPED | OUTPATIENT
Start: 2018-11-05 | End: 2018-11-13

## 2018-11-05 NOTE — TELEPHONE ENCOUNTER
Was the patient seen in the last year in this department? Yes    Does patient have an active prescription for medications requested? Yes    Received Request Via: Pharmacy     Last Appointment 5/8/18  Last Labs 1/24/17

## 2018-11-13 ENCOUNTER — OFFICE VISIT (OUTPATIENT)
Dept: MEDICAL GROUP | Facility: CLINIC | Age: 73
End: 2018-11-13
Payer: MEDICARE

## 2018-11-13 VITALS
BODY MASS INDEX: 23.02 KG/M2 | WEIGHT: 125.1 LBS | TEMPERATURE: 98.3 F | SYSTOLIC BLOOD PRESSURE: 104 MMHG | OXYGEN SATURATION: 98 % | HEART RATE: 120 BPM | DIASTOLIC BLOOD PRESSURE: 70 MMHG | HEIGHT: 62 IN | RESPIRATION RATE: 20 BRPM

## 2018-11-13 DIAGNOSIS — J44.9 CHRONIC OBSTRUCTIVE PULMONARY DISEASE, UNSPECIFIED COPD TYPE (HCC): ICD-10-CM

## 2018-11-13 DIAGNOSIS — J96.11 CHRONIC RESPIRATORY FAILURE WITH HYPOXIA (HCC): ICD-10-CM

## 2018-11-13 DIAGNOSIS — Z23 NEED FOR VACCINATION: ICD-10-CM

## 2018-11-13 DIAGNOSIS — S60.456A FOREIGN BODY OF RIGHT LITTLE FINGER: ICD-10-CM

## 2018-11-13 PROCEDURE — 90662 IIV NO PRSV INCREASED AG IM: CPT | Performed by: NURSE PRACTITIONER

## 2018-11-13 PROCEDURE — G0008 ADMIN INFLUENZA VIRUS VAC: HCPCS | Performed by: NURSE PRACTITIONER

## 2018-11-13 PROCEDURE — 99213 OFFICE O/P EST LOW 20 MIN: CPT | Mod: 25 | Performed by: NURSE PRACTITIONER

## 2018-11-13 RX ORDER — TRAMADOL HYDROCHLORIDE 50 MG/1
TABLET ORAL
Refills: 0 | COMMUNITY
Start: 2018-11-06

## 2018-11-13 RX ORDER — PREDNISONE 20 MG/1
TABLET ORAL
Refills: 0 | COMMUNITY
Start: 2018-11-01

## 2018-11-13 RX ORDER — CLOPIDOGREL BISULFATE 75 MG/1
75 TABLET ORAL DAILY
COMMUNITY

## 2018-11-14 NOTE — ASSESSMENT & PLAN NOTE
Patient has not been seen by pulmonology in over a year and a half for this problem. She states that she typically only goes to see Dr. Rico as needed when her symptoms become worse but she has not seen him in some time. She continues wearing 24-hour oxygen and using Advair, ipratropium inhaled spray daily and albuterol as needed.    Patient reports recently she become more short of breath of the last couple of weeks.  She reports that she feels like her nose is stuffed up.  She has tried several nasal sprays but unfortunately she reports this does not seem to help.  She denies any fevers.  Her heart rate is little elevated today.  She was running late for her appointment so she reports that she was a little bit more anxious.  She admits that her anxiety contributes to her shortness of breath.

## 2018-11-14 NOTE — PROGRESS NOTES
Subjective:     Sarah Rosas is a 73 y.o. female here today for evaluation management the following:    Chronic respiratory failure with hypoxia (CMS-HCC)  Patient is typically on continuous oxygen 2 L, however, she is up to 5 L today as she reports she is feeling more short of breath.    COPD (chronic obstructive pulmonary disease) (CMS-HCC)  Patient has not been seen by pulmonology in over a year and a half for this problem. She states that she typically only goes to see Dr. Rico as needed when her symptoms become worse but she has not seen him in some time. She continues wearing 24-hour oxygen and using Advair, ipratropium inhaled spray daily and albuterol as needed.    Patient reports recently she become more short of breath of the last couple of weeks.  She reports that she feels like her nose is stuffed up.  She has tried several nasal sprays but unfortunately she reports this does not seem to help.  She denies any fevers.  Her heart rate is little elevated today.  She was running late for her appointment so she reports that she was a little bit more anxious.  She admits that her anxiety contributes to her shortness of breath.    Foreign body of right little finger  Patient reports several months ago she had a piece of glass in her right pinky finger medial aspect.  She reports that she has been trying to get this piece out for several weeks but reports that unfortunately she has been unsuccessful.  She reports at times there is pus that comes out?  At this time she denies any pus or redness or pain to the area.  She reports that she just would like the glass removed.  She reports is actually from a broken light bulb several months ago.       Current medicines (including changes today)  Current Outpatient Prescriptions   Medication Sig Dispense Refill   • clopidogrel (PLAVIX) 75 MG Tab Take 75 mg by mouth every day.     • lisinopril (PRINIVIL) 10 MG Tab TAKE ONE TABLET BY MOUTH ONCE A DAY 90 Tab  0   • fluticasone-salmeterol (ADVAIR DISKUS) 100-50 MCG/DOSE AEROSOL POWDER, BREATH ACTIVATED Inhale 1 Puff by mouth every 12 hours. 1 Inhaler 0   • conjugated estrogen (PREMARIN) 0.625 MG Tab TAKE ONE TABLET BY MOUTH DAILY 30 Tab 0   • escitalopram (LEXAPRO) 20 MG tablet TAKE ONE TABLET BY MOUTH EVERY DAY 30 Tab 2   • traZODone (DESYREL) 100 MG Tab TAKE TWO TABLETS BY MOUTH EVERY DAY AT BEDTIME AS NEEDED FOR SLEEP 60 Tab 2   • baclofen (LIORESAL) 10 MG Tab TAKE ONE TABLET BY MOUTH THREE TIMES A DAY 90 Tab 2   • buPROPion SR (WELLBUTRIN-SR) 150 MG TABLET SR 12 HR sustained-release tablet TAKE ONE TABLET BY MOUTH TWICE A DAY 60 Tab 2   • ferrous sulfate 325 (65 Fe) MG tablet TAKE ONE TABLET BY MOUTH EVERY DAY 30 Tab 2   • pravastatin (PRAVACHOL) 20 MG Tab Take 1 Tab by mouth every bedtime. 90 Tab 3   • gabapentin (NEURONTIN) 600 MG tablet Take 1 Tab by mouth 2 times a day. 90 Tab 3   • Cholecalciferol (VITAMIN D3) 2000 UNIT Cap Take 1 Cap by mouth every day. 90 Cap 3   • albuterol (PROAIR HFA) 108 (90 Base) MCG/ACT Aero Soln inhalation aerosol INHALE TWO PUFFS BY MOUTH EVERY 6 HOURS AS NEEDED FOR SHORTNESS OF BREATH 1 Inhaler 5   • albuterol (PROVENTIL) 2.5mg/3ml Nebu Soln solution for nebulization 2.5 mg by Nebulization route every four hours as needed for Shortness of Breath.     • formoterol fumarate (PERFOROMIST) 20 MCG/2ML Nebu Soln Inhale 20 mcg by mouth.     • aspirin (ASA) 325 MG Tab Take 325 mg by mouth every 6 hours as needed.     • Multiple Vitamins-Minerals (WOMENS MULTI VITAMIN & MINERAL) TABS Take  by mouth every day.     • predniSONE (DELTASONE) 20 MG Tab   0   • tramadol (ULTRAM) 50 MG Tab   0   • ipratropium (ATROVENT) 0.03 % Solution SPRAY 2 SPRAYS IN NOSE EVERY 12 HOURS. 30 mL 2   • Misc. Devices (TRANSPORT CHAIR) Mis Requires power chair due to inability to walk farther than 200 feet without struggling to breathe 1 Each 0   • Misc. Devices (PULSE OXIMETER) Misc 1 Device by Does not apply route  "every day. 1 Each 0     No current facility-administered medications for this visit.        She  has a past medical history of Anxiety and depression (4/2/2015); Back pain; Bipolar affect, depressed ([facility]) (6/23/2012); and COPD (chronic obstructive pulmonary disease) ([facility]).    She  has a past surgical history that includes hysterectomy, total abdominal; endometrial ablation; appendectomy; and hip nailing intramedullary (Left).     Social History     Social History   • Marital status: Legally      Spouse name: N/A   • Number of children: N/A   • Years of education: N/A     Social History Main Topics   • Smoking status: Former Smoker     Packs/day: 1.00     Years: 40.00     Types: Cigarettes     Quit date: 3/1/2015   • Smokeless tobacco: Never Used   • Alcohol use No   • Drug use: No   • Sexual activity: Not on file     Other Topics Concern   • Not on file     Social History Narrative   • No narrative on file       Family History   Problem Relation Age of Onset   • Heart Disease Father 61   • Other Sister    • Heart Disease Sister         Pacemaker         ROS  Positive for generalized chronic pain.  Positive for shortness of breath.  Positive for foreign body in her right pinky finger.  No fever, no chest pain,  no abdominal pain, no rashes    All other systems reviewed and are negative.        Objective:     Blood pressure 104/70, pulse (!) 120, temperature 36.8 °C (98.3 °F), temperature source Temporal, resp. rate 20, height 1.575 m (5' 2\"), weight 56.7 kg (125 lb 1.6 oz), SpO2 98 %, not currently breastfeeding. Body mass index is 22.88 kg/m².    Physical Exam:   Constitutional: Alert, no distress. On 5 L O2 continuous, she is at times laying down as she reports this helps her to breath better.   Eye: Equal, round and reactive, conjunctiva clear, lids normal.   ENMT: Lips without lesions, oropharynx clear.   Neck: Trachea midline, no masses, no thyromegaly. No cervical or supraclavicular " lymphadenopathy  Respiratory: Unlabored respiratory effort, lungs clear to auscultation, no wheezes, no ronchi. Diminished bases.   Cardiovascular: Normal S1, S2, no murmur, no edema.   Abdomen: Soft, non-tender, no masses, no hepatosplenomegaly. Normal bowel sounds.   Skin: Warm, dry, good turgor, no rashes in visible areas. There does appear to be a FB palpated on the medial aspect of the right pinky finger.  There is no signs or symptoms of infection, no drainage, no redness.  No pain with palpation.  Psych: Alert and oriented x3, normal affect and mood.        Assessment and Plan:   The following treatment plan was discussed    1. Chronic obstructive pulmonary disease, unspecified COPD type (HCC)  I have advised patient I am concerned of her shortness of breath and also increase of oxygen.  She did have a friend drive her here today who is actually also being seen.  Advised patient that likely she should report to the ER.  She reports that she likely is not able to get a chest x-ray or even report to the ER.  Advised patient if worsening over the next couple of days to please report to the ER and she has agreed.  - DX-CHEST-2 VIEWS; Future    2. Chronic respiratory failure with hypoxia (CMS-HCC)    3. Foreign body of right little finger  No s/s of infection but we did discuss s/s to seek care emergently.   - REFERRAL TO GENERAL SURGERY    4. Need for vaccination  - INFLUENZA VACCINE, HIGH DOSE (65+ ONLY)      Reviewed indication, dosage, usage and potential adverse effects of prescribed medications. Patient appears to understand, verbalizes understanding and is willing to try medications as prescribed.      Reviewed risks and benefits of treatment plan. Patient verbally agrees to plan of care.       Followup: Return if symptoms worsen or fail to improve.    GRETTA CasianoRFLEX.     PLEASE NOTE: This dictation was created using voice recognition software. I have made every reasonable attempt to correct  obvious errors, but I expect that there may be errors of grammar and possibly content that I did not discover prior finalizing this note.

## 2018-11-14 NOTE — PATIENT INSTRUCTIONS
Your medical care was provided today by: JULISSA Reza    Thank You for the opportunity to serve you.    You may receive a brief survey in the mail shortly regarding your visit today. Please take a few moments to complete the survey and return it; no postage is necessary. We are working to serve our patient population better, improve customer service and our patients overall experience and your input can help us to accomplish this. We thank you for your help and for the opportunity to serve you today and in the future.     Special Instructions:  Always call 9-1-1 immediately if you develop a life threatening emergency.    Unless told otherwise please take all medications as directed and complete prescription therapies.     Watch for the following signs that require additional evaluation: progressive lethargy or unresponsiveness, localized pain (chest, abdomen), shortness of breath, painful breathing, progressive vomiting with weakness, bloody stools, or new rash.     If you are prescribed pain medication or any other medication that is sedating, do not take medication before or while operating a vehicle or heavy machinery or equipment due to potential side effects such as drowsiness and/or dizziness.

## 2018-11-14 NOTE — ASSESSMENT & PLAN NOTE
Patient is typically on continuous oxygen 2 L, however, she is up to 5 L today as she reports she is feeling more short of breath.

## 2018-11-14 NOTE — ASSESSMENT & PLAN NOTE
Patient reports several months ago she had a piece of glass in her right pinky finger medial aspect.  She reports that she has been trying to get this piece out for several weeks but reports that unfortunately she has been unsuccessful.  She reports at times there is pus that comes out?  At this time she denies any pus or redness or pain to the area.  She reports that she just would like the glass removed.  She reports is actually from a broken light bulb several months ago.

## 2018-11-29 ENCOUNTER — NON-PROVIDER VISIT (OUTPATIENT)
Dept: URGENT CARE | Facility: PHYSICIAN GROUP | Age: 73
End: 2018-11-29
Payer: MEDICARE

## 2018-11-29 ENCOUNTER — APPOINTMENT (OUTPATIENT)
Dept: RADIOLOGY | Facility: IMAGING CENTER | Age: 73
End: 2018-11-29
Attending: NURSE PRACTITIONER
Payer: MEDICARE

## 2018-11-29 DIAGNOSIS — G89.29 CHRONIC LEFT HIP PAIN: ICD-10-CM

## 2018-11-29 DIAGNOSIS — M25.552 CHRONIC LEFT HIP PAIN: ICD-10-CM

## 2018-11-29 DIAGNOSIS — J42 CHRONIC BRONCHITIS, UNSPECIFIED CHRONIC BRONCHITIS TYPE (HCC): ICD-10-CM

## 2018-11-29 DIAGNOSIS — J44.9 CHRONIC OBSTRUCTIVE PULMONARY DISEASE, UNSPECIFIED COPD TYPE (HCC): ICD-10-CM

## 2018-11-29 DIAGNOSIS — M25.552 LEFT HIP PAIN: ICD-10-CM

## 2018-11-29 PROCEDURE — 73502 X-RAY EXAM HIP UNI 2-3 VIEWS: CPT | Mod: TC,FY,LT | Performed by: PHYSICIAN ASSISTANT

## 2018-11-29 PROCEDURE — 71046 X-RAY EXAM CHEST 2 VIEWS: CPT | Mod: TC,FY | Performed by: PHYSICIAN ASSISTANT

## 2018-12-27 ENCOUNTER — TELEPHONE (OUTPATIENT)
Dept: MEDICAL GROUP | Facility: CLINIC | Age: 73
End: 2018-12-27

## 2018-12-27 NOTE — TELEPHONE ENCOUNTER
Phone Number Called: [phone] (home)       Message: left voice message for patient to call [phone] to go over results-per PCP    Left Message for patient to call back: yes

## 2018-12-27 NOTE — TELEPHONE ENCOUNTER
----- Message from MARICEL Casiano sent at 11/30/2018  8:16 AM PST -----  Please call to make an appt so we can discuss her hip xray results and plan moving forward. Thank you.  JULISSA Reza

## 2019-01-11 DIAGNOSIS — H01.119 EYELID DERMATITIS, ALLERGIC/CONTACT, UNSPECIFIED LATERALITY: ICD-10-CM

## 2019-01-11 DIAGNOSIS — D50.9 IRON DEFICIENCY ANEMIA, UNSPECIFIED IRON DEFICIENCY ANEMIA TYPE: ICD-10-CM

## 2019-01-11 NOTE — LETTER
January 18, 2019        Sarah Almendarez [address]  [address]  [place] [address]        Dear Sarah Almendarez:    Please complete your labs for any future refills.       If you have any questions or concerns, please don't hesitate to call.        Sincerely,        JERED Casiano.    Electronically Signed

## 2019-01-14 RX ORDER — FERROUS SULFATE 325(65) MG
TABLET ORAL
Qty: 90 TAB | Refills: 0 | Status: SHIPPED | OUTPATIENT
Start: 2019-01-14 | End: 2019-04-08 | Stop reason: SDUPTHER

## 2019-01-14 RX ORDER — OLOPATADINE HYDROCHLORIDE 1 MG/ML
SOLUTION/ DROPS OPHTHALMIC
Qty: 5 ML | Refills: 2 | Status: SHIPPED | OUTPATIENT
Start: 2019-01-14 | End: 2019-02-05 | Stop reason: SDUPTHER

## 2019-02-05 ENCOUNTER — OFFICE VISIT (OUTPATIENT)
Dept: MEDICAL GROUP | Facility: CLINIC | Age: 74
End: 2019-02-05
Payer: MEDICARE

## 2019-02-05 VITALS
OXYGEN SATURATION: 97 % | RESPIRATION RATE: 16 BRPM | SYSTOLIC BLOOD PRESSURE: 118 MMHG | TEMPERATURE: 98 F | DIASTOLIC BLOOD PRESSURE: 76 MMHG | HEART RATE: 97 BPM | WEIGHT: 125 LBS | BODY MASS INDEX: 23 KG/M2 | HEIGHT: 62 IN

## 2019-02-05 DIAGNOSIS — J96.11 CHRONIC RESPIRATORY FAILURE WITH HYPOXIA (HCC): ICD-10-CM

## 2019-02-05 DIAGNOSIS — M24.7 PROTRUSIO ACETABULI DETERMINED BY X-RAY: ICD-10-CM

## 2019-02-05 DIAGNOSIS — S60.456A FOREIGN BODY OF RIGHT LITTLE FINGER: ICD-10-CM

## 2019-02-05 DIAGNOSIS — M85.89 OSTEOPENIA OF MULTIPLE SITES: ICD-10-CM

## 2019-02-05 DIAGNOSIS — G89.29 CHRONIC LEFT HIP PAIN: ICD-10-CM

## 2019-02-05 DIAGNOSIS — M25.552 CHRONIC LEFT HIP PAIN: ICD-10-CM

## 2019-02-05 DIAGNOSIS — F33.1 MODERATE EPISODE OF RECURRENT MAJOR DEPRESSIVE DISORDER (HCC): ICD-10-CM

## 2019-02-05 DIAGNOSIS — J44.9 CHRONIC OBSTRUCTIVE PULMONARY DISEASE, UNSPECIFIED COPD TYPE (HCC): ICD-10-CM

## 2019-02-05 PROCEDURE — 99214 OFFICE O/P EST MOD 30 MIN: CPT | Performed by: NURSE PRACTITIONER

## 2019-02-05 RX ORDER — OLOPATADINE HYDROCHLORIDE 1 MG/ML
SOLUTION/ DROPS OPHTHALMIC
Qty: 5 ML | Refills: 2 | Status: SHIPPED | OUTPATIENT
Start: 2019-02-05 | End: 2019-04-11 | Stop reason: SDUPTHER

## 2019-02-05 NOTE — PATIENT INSTRUCTIONS
Your medical care was provided today by: JULISSA Reza    Thank You for the opportunity to serve you.    You may receive a brief survey in the mail shortly regarding your visit today. Please take a few moments to complete the survey and return it; no postage is necessary. We are working to serve our patient population better, improve customer service and our patients overall experience and your input can help us to accomplish this. We thank you for your help and for the opportunity to serve you today and in the future.     Labs and Diagnostic Testing   Please note that if we have ordered labs or diagnostic testing, those results may be released to you on Bitro[employer] prior to my review. While we do our best to review your results as soon as possible, there are times when you may see your results prior to provider review. Of course, if you ever have any questions or concerns about your results, please contact our clinic.     Special Instructions:  Always call 9-1-1 immediately if you develop a life threatening emergency.    Unless told otherwise please take all medications as directed and complete prescription therapies.     Watch for the following signs that require additional evaluation: progressive lethargy or unresponsiveness, localized pain (chest, abdomen), shortness of breath, painful breathing, progressive vomiting with weakness, bloody stools, or new rash.     If you are prescribed pain medication or any other medication that is sedating, do not take medication before or while operating a vehicle or heavy machinery or equipment due to potential side effects such as drowsiness and/or dizziness.

## 2019-02-06 NOTE — ASSESSMENT & PLAN NOTE
This is a chronic problem which actually has been well controlled recently per patient.  She is currently prescribed Lexapro 20 mg daily, trazodone 100 mg at bedtime for sleep.  Denies any SI/HI.

## 2019-02-06 NOTE — ASSESSMENT & PLAN NOTE
Patient is typically on continuous oxygen 2 L, she does admit that over time she has increased to 3 L.  She did have a little bit of a lower oxygen sat, however, she needed to turn up her oxygen.  O2 sat was then 97%.  Patient denies feeling increased shortness of breath.  Reports she feels well.

## 2019-02-06 NOTE — ASSESSMENT & PLAN NOTE
PAtient denies recent fall or injury although she contiues to complain of hip pain since her fall which resulted in fracture and possible hip replacement? In 2015.  She did recently have an x-ray, results as below.  Patient reports that she continues to have hip pain.    11/29/2018 4:09 PM    HISTORY/REASON FOR EXAM: Chronic left hip pain    TECHNIQUE/EXAM DESCRIPTION AND NUMBER OF VIEWS:  2 view of the pelvis.    COMPARISON: None    FINDINGS:  Left hip arthroplasty has been performed. Femoral component is longstem with cerclage wires.    There is no evidence of loosening of the components    There is mild acetabuli protrusio    No periprosthetic fracture is detected.    Moderate caudal lumbar degenerative disc disease greater than facet arthropathy   Impression       Mild left acetabuli protrusio    Left hip arthroplasty without evidence of loosening

## 2019-02-06 NOTE — ASSESSMENT & PLAN NOTE
Patient reports several months ago she had a piece of glass in her right pinky finger medial aspect.  She reports that she has been trying to get this piece out for several weeks but reports that unfortunately she has been unsuccessful.  She reports at times there is pus that comes out?  At this time she denies any pus or redness or pain to the area.  She reports that she just would like the glass removed.  She reports is actually from a broken light bulb several months ago.    Since her last visit, patient has not followed up, however, she has an appointment on February 7th to have this removed. Will see Dr. Steward in [place].

## 2019-02-06 NOTE — ASSESSMENT & PLAN NOTE
Discussed with patient importance of DEXA scan, she is not certain she will obtain it.  Discussed risk of fall.  She is currently taking vitamin D supplementation.

## 2019-02-06 NOTE — ASSESSMENT & PLAN NOTE
Patient has not been seen by pulmonology in over a year and a half for this problem. She states that she typically only goes to see Dr. Rico as needed when her symptoms become worse but she has not seen him in some time. She continues wearing 24-hour oxygen and using Advair, ipratropium inhaled spray daily and albuterol as needed.    She reports that she has not seen Dr. Harris since her last visit.  She plans to make an appointment.

## 2019-02-06 NOTE — PROGRESS NOTES
Subjective:     Sarah Rosas is a 73 y.o. female here today for evaluation management of the following:    Chronic left hip pain  PAtient denies recent fall or injury although she contiues to complain of hip pain since her fall which resulted in fracture and possible hip replacement? In 2015.  She did recently have an x-ray, results as below.  Patient reports that she continues to have hip pain.    11/29/2018 4:09 PM    HISTORY/REASON FOR EXAM: Chronic left hip pain    TECHNIQUE/EXAM DESCRIPTION AND NUMBER OF VIEWS:  2 view of the pelvis.    COMPARISON: None    FINDINGS:  Left hip arthroplasty has been performed. Femoral component is longstem with cerclage wires.    There is no evidence of loosening of the components    There is mild acetabuli protrusio    No periprosthetic fracture is detected.    Moderate caudal lumbar degenerative disc disease greater than facet arthropathy   Impression       Mild left acetabuli protrusio    Left hip arthroplasty without evidence of loosening         Chronic respiratory failure with hypoxia (CMS-HCC)  Patient is typically on continuous oxygen 2 L, she does admit that over time she has increased to 3 L.  She did have a little bit of a lower oxygen sat, however, she needed to turn up her oxygen.  O2 sat was then 97%.  Patient denies feeling increased shortness of breath.  Reports she feels well.    COPD (chronic obstructive pulmonary disease) (CMS-HCC)  Patient has not been seen by pulmonology in over a year and a half for this problem. She states that she typically only goes to see Dr. Rico as needed when her symptoms become worse but she has not seen him in some time. She continues wearing 24-hour oxygen and using Advair, ipratropium inhaled spray daily and albuterol as needed.    She reports that she has not seen Dr. Harris since her last visit.  She plans to make an appointment.    Foreign body of right little finger  Patient reports several months ago she had a  piece of glass in her right pinky finger medial aspect.  She reports that she has been trying to get this piece out for several weeks but reports that unfortunately she has been unsuccessful.  She reports at times there is pus that comes out?  At this time she denies any pus or redness or pain to the area.  She reports that she just would like the glass removed.  She reports is actually from a broken light bulb several months ago.    Since her last visit, patient has not followed up, however, she has an appointment on February 7th to have this removed. Will see Dr. Steward in [place].     Moderate episode of recurrent major depressive disorder (CMS-HCC) (HCC)  This is a chronic problem which actually has been well controlled recently per patient.  She is currently prescribed Lexapro 20 mg daily, trazodone 100 mg at bedtime for sleep.  Denies any SI/HI.    Osteopenia of multiple sites  Discussed with patient importance of DEXA scan, she is not certain she will obtain it.  Discussed risk of fall.  She is currently taking vitamin D supplementation.         Current medicines (including changes today)  Current Outpatient Prescriptions   Medication Sig Dispense Refill   • olopatadine (PATANOL) 0.1 % ophthalmic solution INSTILL 1 DROP IN EACH EYE TWO TIMES A DAY 5 mL 2   • ferrous sulfate 325 (65 Fe) MG tablet TAKE ONE TABLET BY MOUTH EVERY DAY 90 Tab 0   • ADVAIR DISKUS 100-50 MCG/DOSE AEROSOL POWDER, BREATH ACTIVATED INHALE 1 PUFF BY MOUTH EVERY 12 HOURS 2 Inhaler 2   • PREMARIN 0.625 MG Tab TAKE ONE TABLET BY MOUTH EVERY DAY 90 Tab 0   • baclofen (LIORESAL) 10 MG Tab TAKE ONE TABLET BY MOUTH THREE TIMES A DAY 90 Tab 2   • ipratropium (ATROVENT) 0.03 % Solution SPRAY 2 SPRAYS IN NOSE EVERY 12 HOURS. 30 mL 2   • gabapentin (NEURONTIN) 600 MG tablet TAKE ONE TABLET BY MOUTH TWO TIMES A DAY 90 Tab 3   • PROAIR  (90 Base) MCG/ACT Aero Soln inhalation aerosol INHALE 2 PUFFS BY MOUTH EVERY 6 HOURS AS NEEDED FOR SHORTNESS  OF BREATH 8.5 g 10   • escitalopram (LEXAPRO) 20 MG tablet TAKE ONE TABLET BY MOUTH EVERY DAY 30 Tab 10   • traZODone (DESYREL) 100 MG Tab TAKE TWO TABLETS BY MOUTH EVERY DAY AT BEDTIME AS NEEDED FOR SLEEP 60 Tab 10   • lisinopril (PRINIVIL) 10 MG Tab TAKE ONE TABLET BY MOUTH EVERY DAY 90 Tab 10   • buPROPion SR (WELLBUTRIN-SR) 150 MG TABLET SR 12 HR sustained-release tablet TAKE ONE TABLET BY MOUTH TWICE A DAY 60 Tab 10   • clopidogrel (PLAVIX) 75 MG Tab Take 75 mg by mouth every day.     • predniSONE (DELTASONE) 20 MG Tab   0   • tramadol (ULTRAM) 50 MG Tab   0   • lisinopril (PRINIVIL) 10 MG Tab TAKE ONE TABLET BY MOUTH ONCE A DAY 90 Tab 0   • conjugated estrogen (PREMARIN) 0.625 MG Tab TAKE ONE TABLET BY MOUTH DAILY 30 Tab 0   • Misc. Devices (TRANSPORT CHAIR) Misc Requires power chair due to inability to walk farther than 200 feet without struggling to breathe 1 Each 0   • pravastatin (PRAVACHOL) 20 MG Tab Take 1 Tab by mouth every bedtime. 90 Tab 3   • Cholecalciferol (VITAMIN D3) 2000 UNIT Cap Take 1 Cap by mouth every day. 90 Cap 3   • Misc. Devices (PULSE OXIMETER) Misc 1 Device by Does not apply route every day. 1 Each 0   • albuterol (PROVENTIL) 2.5mg/3ml Nebu Soln solution for nebulization 2.5 mg by Nebulization route every four hours as needed for Shortness of Breath.     • formoterol fumarate (PERFOROMIST) 20 MCG/2ML Nebu Soln Inhale 20 mcg by mouth.     • aspirin (ASA) 325 MG Tab Take 325 mg by mouth every 6 hours as needed.     • Multiple Vitamins-Minerals (WOMENS MULTI VITAMIN & MINERAL) TABS Take  by mouth every day.       No current facility-administered medications for this visit.        She  has a past medical history of Anxiety and depression (4/2/2015); Back pain; Bipolar affect, depressed ([facility]) (6/23/2012); and COPD (chronic obstructive pulmonary disease) ([facility]).    She  has a past surgical history that includes hysterectomy, total abdominal; endometrial ablation; appendectomy; and hip nailing  "intramedullary (Left).     Social History     Social History   • Marital status: Legally      Spouse name: N/A   • Number of children: N/A   • Years of education: N/A     Social History Main Topics   • Smoking status: Former Smoker     Packs/day: 1.00     Years: 40.00     Types: Cigarettes     Quit date: 3/1/2015   • Smokeless tobacco: Never Used   • Alcohol use No   • Drug use: No   • Sexual activity: Not on file     Other Topics Concern   • Not on file     Social History Narrative   • No narrative on file       Family History   Problem Relation Age of Onset   • Heart Disease Father 61   • Other Sister    • Heart Disease Sister         Pacemaker         ROS  Positive for left hip pain.   No fever, no chest pain, no shortness of breath, no abdominal pain, no rashes    All other systems reviewed and are negative.        Objective:     Blood pressure 118/76, pulse 97, temperature 36.7 °C (98 °F), temperature source Temporal, resp. rate 16, height 1.575 m (5' 2\"), weight 56.7 kg (125 lb), SpO2 97 %, not currently breastfeeding. Body mass index is 22.86 kg/m².    Physical Exam:   Constitutional: Alert, no distress. On 3 L O2 continuous, she is at times laying down as she reports this helps her to breath better.   Eye: Equal, round and reactive, conjunctiva clear, lids normal.   ENMT: Lips without lesions, oropharynx clear.   Neck: Trachea midline, no masses, no thyromegaly. No cervical or supraclavicular lymphadenopathy  Respiratory: Unlabored respiratory effort, lungs clear to auscultation, no wheezes, no ronchi. Diminished bases.   Cardiovascular: Normal S1, S2, no murmur, no edema.   Abdomen: Soft, non-tender, no masses, no hepatosplenomegaly. Normal bowel sounds.   Skin: Warm, dry, good turgor, no rashes in visible areas. There does appear to be a FB palpated on the medial aspect of the right pinky finger.  There is no signs or symptoms of infection, no drainage, no redness.  No pain with palpation.  Psych: " "Alert and oriented x3, normal affect and mood.      Assessment and Plan:   The following treatment plan was discussed    1. Foreign body of right little finger  Encouraged to keep upcoming appt. discussed signs or symptoms to seek emergent care    2. Chronic respiratory failure with hypoxia (CMS-HCC)  Encourage patient to make an appointment with Dr. Harris    3. Chronic obstructive pulmonary disease, unspecified COPD type (HCC)  See above    4. Chronic left hip pain  Based on x-ray and continued pain, referral to Orth O  - REFERRAL TO ORTHOPEDICS    5. 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 Moderate episode of recurrent major depressive disorder (HCC)  Stable     7. Osteopenia of multiple sites  Discussed with patient risks of not completing scan.  She will \"think about it.  \"  - DS-BONE DENSITY STUDY (DEXA); Future    Encourage patient to complete her labs which she has not done so, she plans to do so this week.  She is hesitant to make a follow-up appointment sooner than 6 months, but I have discussed with patient to please complete her labs soon so that we may have her return to clinic if necessary sooner.    Reviewed indication, dosage, usage and potential adverse effects of prescribed medications. Patient appears to understand, verbalizes understanding and is willing to try medications as prescribed.      Reviewed risks and benefits of treatment plan. Patient verbally agrees to plan of care.       Followup: Return for Lab follow up.    [email or web address].     PLEASE NOTE: This dictation was created using voice recognition software. I have made every reasonable attempt to correct obvious errors, but I expect that there may be errors of grammar and possibly content that I did not discover prior finalizing this note.          "

## 2019-02-07 DIAGNOSIS — D50.9 IRON DEFICIENCY ANEMIA, UNSPECIFIED IRON DEFICIENCY ANEMIA TYPE: ICD-10-CM

## 2019-02-07 DIAGNOSIS — J43.9 PULMONARY EMPHYSEMA, UNSPECIFIED EMPHYSEMA TYPE (HCC): ICD-10-CM

## 2019-02-07 DIAGNOSIS — J44.9 CHRONIC OBSTRUCTIVE PULMONARY DISEASE, UNSPECIFIED COPD TYPE (HCC): ICD-10-CM

## 2019-02-11 RX ORDER — FERROUS SULFATE 325(65) MG
TABLET ORAL
Qty: 90 TAB | Refills: 1 | OUTPATIENT
Start: 2019-02-11

## 2019-02-11 RX ORDER — CONJUGATED ESTROGENS 0.62 MG/1
TABLET, FILM COATED ORAL
Qty: 90 TAB | Refills: 1 | Status: SHIPPED | OUTPATIENT
Start: 2019-02-11

## 2019-03-11 ENCOUNTER — OFFICE VISIT (OUTPATIENT)
Dept: MEDICAL GROUP | Facility: CLINIC | Age: 74
End: 2019-03-11
Payer: MEDICARE

## 2019-03-11 VITALS
TEMPERATURE: 99.6 F | RESPIRATION RATE: 16 BRPM | DIASTOLIC BLOOD PRESSURE: 78 MMHG | HEART RATE: 98 BPM | BODY MASS INDEX: 22.63 KG/M2 | OXYGEN SATURATION: 92 % | SYSTOLIC BLOOD PRESSURE: 130 MMHG | WEIGHT: 123 LBS | HEIGHT: 62 IN

## 2019-03-11 DIAGNOSIS — K13.79 MOUTH PAIN: ICD-10-CM

## 2019-03-11 PROCEDURE — 99213 OFFICE O/P EST LOW 20 MIN: CPT | Performed by: NURSE PRACTITIONER

## 2019-03-11 NOTE — LETTER
March 11, 2019        Sarah Almendarez Snowball  [address]  [place] [address]      To whom it may concern.       Sarah is a patient of mine and she has been under my care since April 2017. It is my medical opinion that Sarah would benefit from top and bottom dentures as a medical necessity in order to be able to eat nutritious foods and maintain her health.     If you have any questions or concerns, please don't hesitate to call.        Sincerely,        JERED Casiano.    Electronically Signed

## 2019-03-11 NOTE — ASSESSMENT & PLAN NOTE
Patient reports she had top dentures done about 5 years ago, all her teeth were removed at the time due to reported poor dentition, but then report when she had to then get a second pair because her dog had eaten those, she was told by that dentist that her teeth did not need to be removed in the first place. She now only has top dentures which are ill-fitting and no bottom dentures. She reports she has trouble eating daily, she cannot eat nutritious foods and has pain daily.

## 2019-03-11 NOTE — PATIENT INSTRUCTIONS
Your medical care was provided today by: JULISSA Reza    Thank You for the opportunity to serve you.    You may receive a brief survey in the mail shortly regarding your visit today. Please take a few moments to complete the survey and return it; no postage is necessary. We are working to serve our patient population better, improve customer service and our patients overall experience and your input can help us to accomplish this. We thank you for your help and for the opportunity to serve you today and in the future.     Labs and Diagnostic Testing   Please note that if we have ordered labs or diagnostic testing, those results may be released to you on [employer] prior to my review. While we do our best to review your results as soon as possible, there are times when you may see your results prior to provider review. Of course, if you ever have any questions or concerns about your results, please contact our clinic.     Special Instructions:  Always call 9-1-1 immediately if you develop a life threatening emergency.    Unless told otherwise please take all medications as directed and complete prescription therapies.     Watch for the following signs that require additional evaluation: progressive lethargy or unresponsiveness, localized pain (chest, abdomen), shortness of breath, painful breathing, progressive vomiting with weakness, bloody stools, or new rash.     If you are prescribed pain medication or any other medication that is sedating, do not take medication before or while operating a vehicle or heavy machinery or equipment due to potential side effects such as drowsiness and/or dizziness.

## 2019-03-11 NOTE — PROGRESS NOTES
Subjective:     Sarah Rosas is a 73 y.o. female here today for evaluation management of the following:    Mouth pain  Patient reports she had top dentures done about 5 years ago, all her teeth were removed at the time due to reported poor dentition, but then report when she had to then get a second pair because her dog had eaten those, she was told by that dentist that her teeth did not need to be removed in the first place. She now only has top dentures which are ill-fitting and no bottom dentures. She reports she has trouble eating daily, she cannot eat nutritious foods and has pain daily.        Current medicines (including changes today)  Current Outpatient Prescriptions   Medication Sig Dispense Refill   • PROAIR  (90 Base) MCG/ACT Aero Soln inhalation aerosol INHALE 2 PUFFS EVERY 6 HOURS AS NEEDED FOR SHORTNESS OF BREATH 8.5 g 10   • PREMARIN 0.625 MG Tab TAKE ONE TABLET BY MOUTH EVERY DAY 90 Tab 1   • olopatadine (PATANOL) 0.1 % ophthalmic solution INSTILL 1 DROP IN EACH EYE TWO TIMES A DAY 5 mL 2   • ferrous sulfate 325 (65 Fe) MG tablet TAKE ONE TABLET BY MOUTH EVERY DAY 90 Tab 0   • ADVAIR DISKUS 100-50 MCG/DOSE AEROSOL POWDER, BREATH ACTIVATED INHALE 1 PUFF BY MOUTH EVERY 12 HOURS 2 Inhaler 2   • baclofen (LIORESAL) 10 MG Tab TAKE ONE TABLET BY MOUTH THREE TIMES A DAY 90 Tab 2   • ipratropium (ATROVENT) 0.03 % Solution SPRAY 2 SPRAYS IN NOSE EVERY 12 HOURS. 30 mL 2   • gabapentin (NEURONTIN) 600 MG tablet TAKE ONE TABLET BY MOUTH TWO TIMES A DAY 90 Tab 3   • PROAIR  (90 Base) MCG/ACT Aero Soln inhalation aerosol INHALE 2 PUFFS BY MOUTH EVERY 6 HOURS AS NEEDED FOR SHORTNESS OF BREATH 8.5 g 10   • escitalopram (LEXAPRO) 20 MG tablet TAKE ONE TABLET BY MOUTH EVERY DAY 30 Tab 10   • traZODone (DESYREL) 100 MG Tab TAKE TWO TABLETS BY MOUTH EVERY DAY AT BEDTIME AS NEEDED FOR SLEEP 60 Tab 10   • lisinopril (PRINIVIL) 10 MG Tab TAKE ONE TABLET BY MOUTH EVERY DAY 90 Tab 10   • buPROPion SR  (WELLBUTRIN-SR) 150 MG TABLET SR 12 HR sustained-release tablet TAKE ONE TABLET BY MOUTH TWICE A DAY 60 Tab 10   • clopidogrel (PLAVIX) 75 MG Tab Take 75 mg by mouth every day.     • predniSONE (DELTASONE) 20 MG Tab   0   • tramadol (ULTRAM) 50 MG Tab   0   • lisinopril (PRINIVIL) 10 MG Tab TAKE ONE TABLET BY MOUTH ONCE A DAY 90 Tab 0   • conjugated estrogen (PREMARIN) 0.625 MG Tab TAKE ONE TABLET BY MOUTH DAILY 30 Tab 0   • Misc. Devices (TRANSPORT CHAIR) Misc Requires power chair due to inability to walk farther than 200 feet without struggling to breathe 1 Each 0   • pravastatin (PRAVACHOL) 20 MG Tab Take 1 Tab by mouth every bedtime. 90 Tab 3   • Cholecalciferol (VITAMIN D3) 2000 UNIT Cap Take 1 Cap by mouth every day. 90 Cap 3   • Misc. Devices (PULSE OXIMETER) Misc 1 Device by Does not apply route every day. 1 Each 0   • albuterol (PROVENTIL) 2.5mg/3ml Nebu Soln solution for nebulization 2.5 mg by Nebulization route every four hours as needed for Shortness of Breath.     • formoterol fumarate (PERFOROMIST) 20 MCG/2ML Nebu Soln Inhale 20 mcg by mouth.     • aspirin (ASA) 325 MG Tab Take 325 mg by mouth every 6 hours as needed.     • Multiple Vitamins-Minerals (WOMENS MULTI VITAMIN & MINERAL) TABS Take  by mouth every day.       No current facility-administered medications for this visit.        She  has a past medical history of Anxiety and depression (4/2/2015); Back pain; Bipolar affect, depressed ([facility]) (6/23/2012); and COPD (chronic obstructive pulmonary disease) ([facility]).    She  has a past surgical history that includes hysterectomy, total abdominal; endometrial ablation; appendectomy; and hip nailing intramedullary (Left).     Social History     Social History   • Marital status: Legally      Spouse name: N/A   • Number of children: N/A   • Years of education: N/A     Social History Main Topics   • Smoking status: Former Smoker     Packs/day: 1.00     Years: 40.00     Types: Cigarettes     Quit  "date: 3/1/2015   • Smokeless tobacco: Never Used   • Alcohol use No   • Drug use: No   • Sexual activity: Not on file     Other Topics Concern   • Not on file     Social History Narrative   • No narrative on file       Family History   Problem Relation Age of Onset   • Heart Disease Father 61   • Other Sister    • Heart Disease Sister         Pacemaker         ROS  Patient reports mouth pain, chronic.  No fever, no chest pain, no shortness of breath, no abdominal pain, no rashes    All other systems reviewed and are negative.        Objective:     Blood pressure 130/78, pulse 98, temperature 37.6 °C (99.6 °F), temperature source Temporal, resp. rate 16, height 1.575 m (5' 2\"), weight 55.8 kg (123 lb), SpO2 92 %, not currently breastfeeding. Body mass index is 22.5 kg/m².    Physical Exam:   Constitutional: Alert, no distress.  On chronic oxygen  Eye: Equal, round and reactive, conjunctiva clear, lids normal.   ENMT: Lips without lesions, oropharynx clear.  Upper dentures are broken, she has no lower dentures, no teeth upper or lower.  No sores or lesions noted.  Neck: Trachea midline, no masses, no thyromegaly. No cervical or supraclavicular lymphadenopathy  Respiratory: Unlabored respiratory effort, lungs clear to auscultation, no wheezes, no ronchi.  Diminished bases  Cardiovascular: Normal S1, S2, no murmur, no edema.   Skin: Warm, dry, good turgor, no rashes in visible areas.   Psych: Alert and oriented x3, normal affect and mood.        Assessment and Plan:   The following treatment plan was discussed    1. Mouth pain  Patient has lost a few pounds since her last visit.  I do think that her lack of dentures and mouth pain is inhibiting her ability to eat nutritious foods such as vegetables and fruit.  At this time I have completed a letter for patient as requested.  We did fax that letter for the patient as well to the fax listed above on her paperwork.      Reviewed risks and benefits of treatment plan. " Patient verbally agrees to plan of care.       Followup: Return in about 3 months (around 6/11/2019).    JERED Casiano.     PLEASE NOTE: This dictation was created using voice recognition software. I have made every reasonable attempt to correct obvious errors, but I expect that there may be errors of grammar and possibly content that I did not discover prior finalizing this note.

## 2019-04-25 ENCOUNTER — OFFICE VISIT (OUTPATIENT)
Dept: MEDICAL GROUP | Facility: CLINIC | Age: 74
End: 2019-04-25
Payer: MEDICARE

## 2019-04-25 VITALS
SYSTOLIC BLOOD PRESSURE: 126 MMHG | BODY MASS INDEX: 22.82 KG/M2 | HEIGHT: 62 IN | HEART RATE: 89 BPM | TEMPERATURE: 98.2 F | DIASTOLIC BLOOD PRESSURE: 78 MMHG | OXYGEN SATURATION: 89 % | RESPIRATION RATE: 16 BRPM | WEIGHT: 124 LBS

## 2019-04-25 DIAGNOSIS — H81.11 BPPV (BENIGN PAROXYSMAL POSITIONAL VERTIGO), RIGHT: ICD-10-CM

## 2019-04-25 DIAGNOSIS — Z12.31 VISIT FOR SCREENING MAMMOGRAM: ICD-10-CM

## 2019-04-25 DIAGNOSIS — J96.11 CHRONIC RESPIRATORY FAILURE WITH HYPOXIA (HCC): ICD-10-CM

## 2019-04-25 DIAGNOSIS — J44.9 CHRONIC OBSTRUCTIVE PULMONARY DISEASE, UNSPECIFIED COPD TYPE (HCC): ICD-10-CM

## 2019-04-25 DIAGNOSIS — E78.5 HYPERLIPIDEMIA LDL GOAL <100: ICD-10-CM

## 2019-04-25 PROCEDURE — 99214 OFFICE O/P EST MOD 30 MIN: CPT | Performed by: PHYSICIAN ASSISTANT

## 2019-04-25 RX ORDER — PRAVASTATIN SODIUM 40 MG
40 TABLET ORAL
Qty: 90 TAB | Refills: 3 | Status: SHIPPED | OUTPATIENT
Start: 2019-04-25

## 2019-04-25 NOTE — LETTER
April 25, 2019        Sarah Almendarez [facility]  [address]  [place] [address]        To whom it may concern.       Sarah is a patient of mine and she has been under my colleague's care since April 2017. It is my medical opinion that Sarah would benefit from top and bottom dentures as a medical necessity in order to be able to eat nutritious foods and maintain her health. She has difficulty speaking and functioning on a daily basis due to not having proper fitting dentures. She has many medical conditions which require her nutrition to remain stable and could suffer further complications without the ability to chew properly.     If you have any questions or concerns, please don't hesitate to call.      If you have any questions or concerns, please don't hesitate to call.        Sincerely,        Leatha Jacome P.A.-C.    Electronically Signed

## 2019-04-29 PROBLEM — E78.5 HYPERLIPIDEMIA LDL GOAL <100: Status: ACTIVE | Noted: 2019-04-29

## 2019-04-29 NOTE — PROGRESS NOTES
Chief Complaint   Patient presents with   • COPD       HISTORY OF PRESENT ILLNESS: Patient is a 74 y.o. female established patient who presents today for evaluation and management of:    BPPV (benign paroxysmal positional vertigo), right  For the past couple of months, this patient states that with fast head motions and dehydration she feels slightly disoriented and dizzy.  She states her dizziness feels more like the room is spinning rather than the.  She has not tried anything in the recent past although she states she has had physical therapy for this many years ago which worked very well.    Chronic respiratory failure with hypoxia (CMS-HCC)  Patient is typically on pulsed oxygen 2 L, she does admit that over time she has increased to 3 L.  She did have a little bit of a lower oxygen sat, however, she needed to turn up her oxygen.  O2 sat was then 97%.  Patient states she recently has been feeling increased shortness of breath and is requesting referral to pulmonology today.    COPD (chronic obstructive pulmonary disease) (CMS-HCC)  Previously seen by Dr. Harris and recently referred, this patient has not followed up with pulmonology and does continue to wear 24-hour oxygen with Advair, ipratropium and albuterol.    Hyperlipidemia LDL goal <100  February 26, 2019 labs are as follows:  Total cholesterol: 204 mg/dL  HDL cholesterol 71 mg/dL  Triglycerides 110 mg/dL  LDL cholesterol 111 mg/dL-calculated  Patient states she consumes at least 3 eggs per day.  She denies chest pain and does have some blurry vision today.       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 02/05/2019   • Osteopenia of multiple sites 02/05/2019   • Foreign body of right little finger 11/13/2018   • Hormone replacement therapy (postmenopausal)  01/30/2018   • Elevated TSH 01/30/2018   • Chronic left hip pain 01/30/2018   • Abnormal drug screen 10/09/2017   • Chronic pain 02/23/2017   • Chronic respiratory failure with hypoxia (CMS-HCC) 06/08/2016   • Anxiety 05/20/2015   • Moderate episode of recurrent major depressive disorder (HCC) 04/02/2015   • Essential hypertension 04/18/2013   • COPD (chronic obstructive pulmonary disease) (Formerly [facility] [place])        Allergies:Bee    Current Outpatient Prescriptions   Medication Sig Dispense Refill   • pravastatin (PRAVACHOL) 40 MG tablet Take 1 Tab by mouth every bedtime. 90 Tab 3   • gabapentin (NEURONTIN) 600 MG tablet TAKE ONE TABLET BY MOUTH TWO TIMES A DAY 90 Tab 2   • ipratropium (ATROVENT) 0.03 % Solution SPRAY 2 SPRAYS IN EACH NOSTRIL EVERY 12 HOURS 30 mL 4   • olopatadine (PATANOL) 0.1 % ophthalmic solution INSTILL 1 DROP IN EACH EYE TWO TIMES A DAY 5 mL 4   • ferrous sulfate (FEROSUL) 325 (65 Fe) MG tablet Take 1 Tab by mouth every day. 90 Tab 1   • D3 HIGH POTENCY 2000 units Cap TAKE ONE CAPSULE BY MOUTH EVERY DAY 90 Cap 2   • baclofen (LIORESAL) 10 MG Tab TAKE ONE TABLET BY MOUTH THREE TIMES A  Tab 1   • D3 HIGH POTENCY 2000 units Cap TAKE ONE CAPSULE BY MOUTH EVERY DAY 90 Cap 1   • D3 HIGH POTENCY 2000 units Cap TAKE ONE CAPSULE BY MOUTH EVERY DAY 90 Cap 2   • PROAIR  (90 Base) MCG/ACT Aero Soln inhalation aerosol INHALE 2 PUFFS EVERY 6 HOURS AS NEEDED FOR SHORTNESS OF BREATH 8.5 g 10   • PREMARIN 0.625 MG Tab TAKE ONE TABLET BY MOUTH EVERY DAY 90 Tab 1   • ADVAIR DISKUS 100-50 MCG/DOSE AEROSOL POWDER, BREATH ACTIVATED INHALE 1 PUFF BY MOUTH EVERY 12 HOURS 2 Inhaler 2   • PROAIR  (90 Base) MCG/ACT Aero Soln inhalation aerosol INHALE 2 PUFFS BY MOUTH EVERY 6 HOURS AS NEEDED FOR SHORTNESS OF BREATH 8.5 g 10   • escitalopram (LEXAPRO) 20 MG tablet TAKE ONE TABLET BY MOUTH EVERY DAY 30 Tab 10   • traZODone (DESYREL) 100 MG Tab TAKE TWO TABLETS BY MOUTH EVERY DAY AT BEDTIME AS NEEDED FOR SLEEP 60 Tab  10   • lisinopril (PRINIVIL) 10 MG Tab TAKE ONE TABLET BY MOUTH EVERY DAY 90 Tab 10   • buPROPion SR (WELLBUTRIN-SR) 150 MG TABLET SR 12 HR sustained-release tablet TAKE ONE TABLET BY MOUTH TWICE A DAY 60 Tab 10   • clopidogrel (PLAVIX) 75 MG Tab Take 75 mg by mouth every day.     • predniSONE (DELTASONE) 20 MG Tab   0   • tramadol (ULTRAM) 50 MG Tab   0   • lisinopril (PRINIVIL) 10 MG Tab TAKE ONE TABLET BY MOUTH ONCE A DAY 90 Tab 0   • conjugated estrogen (PREMARIN) 0.625 MG Tab TAKE ONE TABLET BY MOUTH DAILY 30 Tab 0   • Misc. Devices (TRANSPORT CHAIR) Misc Requires power chair due to inability to walk farther than 200 feet without struggling to breathe 1 Each 0   • Misc. Devices (PULSE OXIMETER) Misc 1 Device by Does not apply route every day. 1 Each 0   • albuterol (PROVENTIL) 2.5mg/3ml Nebu Soln solution for nebulization 2.5 mg by Nebulization route every four hours as needed for Shortness of Breath.     • formoterol fumarate (PERFOROMIST) 20 MCG/2ML Nebu Soln Inhale 20 mcg by mouth.     • aspirin (ASA) 325 MG Tab Take 325 mg by mouth every 6 hours as needed.     • Multiple Vitamins-Minerals (WOMENS MULTI VITAMIN & MINERAL) TABS Take  by mouth every day.       No current facility-administered medications for this visit.        Social History   Substance Use Topics   • Smoking status: Former Smoker     Packs/day: 1.00     Years: 40.00     Types: Cigarettes     Quit date: 3/1/2015   • Smokeless tobacco: Never Used   • Alcohol use No       Family Status   Relation Status   • Fa    • Mo    • Sis    • Bro Alive   • Sis    • Sis Alive   • Sis Alive   • Sis Alive     Family History   Problem Relation Age of Onset   • Heart Disease Father 61   • Other Sister    • Heart Disease Sister         Pacemaker       Review of Systems: See HPI above.   Constitutional: Negative for fever, chills, weight loss and malaise.   HENT: Negative for ear pain, nosebleeds, congestion, sore throat and neck  "pain.    Eyes: Negative for blurred vision.   Respiratory: Positive for shortness of breath, cough, sputum production and wheezing.    Cardiovascular: Negative for chest pain, palpitations, orthopnea and leg swelling.   Gastrointestinal: Negative for heartburn, nausea, vomiting and abdominal pain.   Genitourinary: Negative for dysuria, urgency and frequency.   Musculoskeletal: Negative for myalgias, back pain and joint pain.   Skin: Negative for rash and itching.   Neurological: Positive for dizziness.  Negative for tingling, tremors, sensory change, focal weakness and headaches.     Exam:  /78 (BP Location: Left arm, Patient Position: Sitting, BP Cuff Size: Adult)   Pulse 89   Temp 36.8 °C (98.2 °F)   Resp 16   Ht 1.575 m (5' 2\")   Wt 56.2 kg (124 lb)   SpO2 89%   Body mass index is 22.68 kg/m².  General:  Well Developed, Well Nourished female in NAD  Head: is grossly normal.   Neck: Supple without masses. Thyroid is not visibly enlarged.  Pulmonary: Diminished to ausculation. Normal effort. No rales, ronchi, or wheezing.  Cardiovascular: Regular rate and rhythm without murmur. Carotid pulses are intact and equal bilaterally.  Extremities: no clubbing, cyanosis, or edema.    Medical decision-making and discussion:  1. Chronic obstructive pulmonary disease, unspecified COPD type (HCC)    - REFERRAL TO PULMONOLOGY    2. Chronic respiratory failure with hypoxia (CMS-HCC)    - REFERRAL TO PULMONOLOGY    3. Hyperlipidemia LDL goal <100  Discussed diet rather than medication control of this problem.  - pravastatin (PRAVACHOL) 40 MG tablet; Take 1 Tab by mouth every bedtime.  Dispense: 90 Tab; Refill: 3    4. Visit for screening mammogram    - [id number]; Future    5. BPPV (benign paroxysmal positional vertigo), right    - REFERRAL TO PHYSICAL THERAPY Reason for Therapy: Eval/Treat/Report      Please note that this dictation was created using voice recognition software. I have made every reasonable " attempt to correct obvious errors, but I expect that there are errors of grammar and possibly content that I did not discover before finalizing the note.

## 2019-04-29 NOTE — ASSESSMENT & PLAN NOTE
Previously seen by Dr. Harris and recently referred, this patient has not followed up with pulmonology and does continue to wear 24-hour oxygen with Advair, ipratropium and albuterol.

## 2019-04-29 NOTE — ASSESSMENT & PLAN NOTE
February 26, 2019 labs are as follows:  Total cholesterol: 204 mg/dL  HDL cholesterol 71 mg/dL  Triglycerides 110 mg/dL  LDL cholesterol 111 mg/dL-calculated  Patient states she consumes at least 3 eggs per day.  She denies chest pain and does have some blurry vision today.

## 2019-04-29 NOTE — ASSESSMENT & PLAN NOTE
For the past couple of months, this patient states that with fast head motions and dehydration she feels slightly disoriented and dizzy.  She states her dizziness feels more like the room is spinning rather than the.  She has not tried anything in the recent past although she states she has had physical therapy for this many years ago which worked very well.

## 2019-04-29 NOTE — ASSESSMENT & PLAN NOTE
Patient is typically on pulsed oxygen 2 L, she does admit that over time she has increased to 3 L.  She did have a little bit of a lower oxygen sat, however, she needed to turn up her oxygen.  O2 sat was then 97%.  Patient states she recently has been feeling increased shortness of breath and is requesting referral to pulmonology today.

## 2019-06-12 DIAGNOSIS — J43.0 UNILATERAL EMPHYSEMA (HCC): ICD-10-CM

## 2019-11-01 ENCOUNTER — NON-PROVIDER VISIT (OUTPATIENT)
Dept: MEDICAL GROUP | Facility: CLINIC | Age: 74
End: 2019-11-01
Payer: MEDICARE

## 2019-11-01 DIAGNOSIS — Z23 NEED FOR IMMUNIZATION AGAINST INFLUENZA: ICD-10-CM

## 2019-11-01 PROCEDURE — 90662 IIV NO PRSV INCREASED AG IM: CPT | Performed by: NURSE PRACTITIONER

## 2019-11-01 PROCEDURE — G0008 ADMIN INFLUENZA VIRUS VAC: HCPCS | Performed by: NURSE PRACTITIONER

## 2021-06-30 ENCOUNTER — TELEPHONE (OUTPATIENT)
Dept: MEDICAL GROUP | Facility: CLINIC | Age: 76
End: 2021-06-30

## 2021-06-30 NOTE — TELEPHONE ENCOUNTER
Phone Number Called: [phone] (home) [phone] (work)    Call outcome: Spoke to patient regarding message below.    Message: No Show Policy was reviewed    Sarah Rosas No Showed a new  apt on 6/24/21 with No primary care provider on file..     NO Sarah Rosas was reminded of appointment.

## 2022-04-01 ENCOUNTER — HOSPITAL ENCOUNTER (OUTPATIENT)
Dept: LAB | Facility: MEDICAL CENTER | Age: 77
End: 2022-04-01
Attending: FAMILY MEDICINE
Payer: COMMERCIAL

## 2022-04-01 LAB
ALBUMIN SERPL BCP-MCNC: 4.8 G/DL (ref 3.2–4.9)
ALBUMIN/GLOB SERPL: 2.5 G/DL
ALP SERPL-CCNC: 72 U/L (ref 30–99)
ALT SERPL-CCNC: 14 U/L (ref 2–50)
ANION GAP SERPL CALC-SCNC: 9 MMOL/L (ref 7–16)
AST SERPL-CCNC: 19 U/L (ref 12–45)
BASOPHILS # BLD AUTO: 1.4 % (ref 0–1.8)
BASOPHILS # BLD: 0.08 K/UL (ref 0–0.12)
BILIRUB SERPL-MCNC: 0.2 MG/DL (ref 0.1–1.5)
BUN SERPL-MCNC: 16 MG/DL (ref 8–22)
CALCIUM SERPL-MCNC: 10.4 MG/DL (ref 8.5–10.5)
CHLORIDE SERPL-SCNC: 99 MMOL/L (ref 96–112)
CO2 SERPL-SCNC: 33 MMOL/L (ref 20–33)
CREAT SERPL-MCNC: 0.54 MG/DL (ref 0.5–1.4)
EOSINOPHIL # BLD AUTO: 0.13 K/UL (ref 0–0.51)
EOSINOPHIL NFR BLD: 2.2 % (ref 0–6.9)
ERYTHROCYTE [DISTWIDTH] IN BLOOD BY AUTOMATED COUNT: 47.3 FL (ref 35.9–50)
GFR SERPLBLD CREATININE-BSD FMLA CKD-EPI: 96 ML/MIN/1.73 M 2
GLOBULIN SER CALC-MCNC: 1.9 G/DL (ref 1.9–3.5)
GLUCOSE SERPL-MCNC: 95 MG/DL (ref 65–99)
HCT VFR BLD AUTO: 38.4 % (ref 37–47)
HGB BLD-MCNC: 11.8 G/DL (ref 12–16)
IMM GRANULOCYTES # BLD AUTO: 0.02 K/UL (ref 0–0.11)
IMM GRANULOCYTES NFR BLD AUTO: 0.3 % (ref 0–0.9)
LYMPHOCYTES # BLD AUTO: 1.11 K/UL (ref 1–4.8)
LYMPHOCYTES NFR BLD: 19 % (ref 22–41)
MCH RBC QN AUTO: 29.5 PG (ref 27–33)
MCHC RBC AUTO-ENTMCNC: 30.7 G/DL (ref 33.6–35)
MCV RBC AUTO: 96 FL (ref 81.4–97.8)
MONOCYTES # BLD AUTO: 0.48 K/UL (ref 0–0.85)
MONOCYTES NFR BLD AUTO: 8.2 % (ref 0–13.4)
NEUTROPHILS # BLD AUTO: 4.01 K/UL (ref 2–7.15)
NEUTROPHILS NFR BLD: 68.9 % (ref 44–72)
NRBC # BLD AUTO: 0 K/UL
NRBC BLD-RTO: 0 /100 WBC
PLATELET # BLD AUTO: 381 K/UL (ref 164–446)
PMV BLD AUTO: 10.2 FL (ref 9–12.9)
POTASSIUM SERPL-SCNC: 4.9 MMOL/L (ref 3.6–5.5)
PROT SERPL-MCNC: 6.7 G/DL (ref 6–8.2)
RBC # BLD AUTO: 4 M/UL (ref 4.2–5.4)
SODIUM SERPL-SCNC: 141 MMOL/L (ref 135–145)
VIT B12 SERPL-MCNC: 942 PG/ML (ref 211–911)
WBC # BLD AUTO: 5.8 K/UL (ref 4.8–10.8)

## 2022-04-01 PROCEDURE — 80053 COMPREHEN METABOLIC PANEL: CPT

## 2022-04-01 PROCEDURE — 85025 COMPLETE CBC W/AUTO DIFF WBC: CPT

## 2022-04-01 PROCEDURE — 36415 COLL VENOUS BLD VENIPUNCTURE: CPT

## 2022-04-01 PROCEDURE — 82607 VITAMIN B-12: CPT

## 2024-12-26 NOTE — TELEPHONE ENCOUNTER
"While at check out, patient informed me that she left a urine sample and left it in the cubbie in the bathroom.  I asked her, \"for the prescriptions?\" She stated yes.  I had patient sign controlled substance agreement and gave her the scripts.  A few minutes later the medical assistant come up and asked if the patient left a sample.  I told her that the patient stated yes.  The MA stated that there was not a sample left.  Both of us went to look in the bathroom.  I called Siva at [facility] pharmacy and asked him to not fill her controlled medication prescriptions, because she needs to come down and leave another sample.    Patient called about 10 minutes later and stated that she did leave a sample in the bathroom in the waiting room, she stated that it is in the window.  I put patient on hold and looked.  She did indeed leave a sample in thew waiting room bathroom.  I talked to the provider.  Patient needs to leave another sample in the correct bathroom.  The sample in the waiting room bathroom could have been tampered with.  Informed patient.  She will try and come in today or tomorrow.  I informed her she will not be able to get her medication until she leaves a sample.  " peroxide/normal saline